# Patient Record
Sex: MALE | Race: WHITE | Employment: OTHER | URBAN - METROPOLITAN AREA
[De-identification: names, ages, dates, MRNs, and addresses within clinical notes are randomized per-mention and may not be internally consistent; named-entity substitution may affect disease eponyms.]

---

## 2023-05-17 ENCOUNTER — HOSPITAL ENCOUNTER (OUTPATIENT)
Dept: PREADMISSION TESTING | Age: 68
End: 2023-05-17
Payer: MEDICARE

## 2023-05-17 ENCOUNTER — HOSPITAL ENCOUNTER (OUTPATIENT)
Dept: CT IMAGING | Age: 68
Discharge: HOME | End: 2023-05-17
Payer: MEDICARE

## 2023-05-17 ENCOUNTER — OFFICE VISIT (OUTPATIENT)
Dept: CARDIOLOGY CLINIC | Age: 68
End: 2023-05-17
Payer: MEDICARE

## 2023-05-17 VITALS
HEART RATE: 76 BPM | HEIGHT: 71 IN | DIASTOLIC BLOOD PRESSURE: 72 MMHG | SYSTOLIC BLOOD PRESSURE: 136 MMHG | WEIGHT: 150 LBS | OXYGEN SATURATION: 99 % | BODY MASS INDEX: 21 KG/M2

## 2023-05-17 DIAGNOSIS — R91.1 PULMONARY NODULE: ICD-10-CM

## 2023-05-17 DIAGNOSIS — Z01.810 PREOPERATIVE CARDIOVASCULAR EXAMINATION: Primary | ICD-10-CM

## 2023-05-17 DIAGNOSIS — I25.10 CORONARY ARTERY DISEASE DUE TO LIPID RICH PLAQUE: ICD-10-CM

## 2023-05-17 DIAGNOSIS — I25.83 CORONARY ARTERY DISEASE DUE TO LIPID RICH PLAQUE: ICD-10-CM

## 2023-05-17 DIAGNOSIS — R06.02 SHORTNESS OF BREATH: ICD-10-CM

## 2023-05-17 LAB
ABO + RH BLD: NORMAL
ANION GAP SERPL CALCULATED.3IONS-SCNC: 14 MMOL/L (ref 3–16)
APTT BLD: 28.7 SEC (ref 22.7–35.9)
BASOPHILS # BLD: 0 K/UL (ref 0–0.2)
BASOPHILS NFR BLD: 0.7 %
BILIRUB UR QL STRIP.AUTO: NEGATIVE
BLD GP AB SCN SERPL QL: NORMAL
BUN SERPL-MCNC: 14 MG/DL (ref 7–20)
CALCIUM SERPL-MCNC: 9.3 MG/DL (ref 8.3–10.6)
CHLORIDE SERPL-SCNC: 101 MMOL/L (ref 99–110)
CLARITY UR: CLEAR
CO2 SERPL-SCNC: 24 MMOL/L (ref 21–32)
COLOR UR: YELLOW
CREAT SERPL-MCNC: 0.8 MG/DL (ref 0.8–1.3)
DEPRECATED RDW RBC AUTO: 21.3 % (ref 12.4–15.4)
EOSINOPHIL # BLD: 0.3 K/UL (ref 0–0.6)
EOSINOPHIL NFR BLD: 4.5 %
GFR SERPLBLD CREATININE-BSD FMLA CKD-EPI: >60 ML/MIN/{1.73_M2}
GLUCOSE SERPL-MCNC: 81 MG/DL (ref 70–99)
GLUCOSE UR STRIP.AUTO-MCNC: NEGATIVE MG/DL
HCT VFR BLD AUTO: 38 % (ref 40.5–52.5)
HGB BLD-MCNC: 12.9 G/DL (ref 13.5–17.5)
HGB UR QL STRIP.AUTO: NEGATIVE
INR PPP: 0.98 (ref 0.84–1.16)
KETONES UR STRIP.AUTO-MCNC: NEGATIVE MG/DL
LEUKOCYTE ESTERASE UR QL STRIP.AUTO: NEGATIVE
LYMPHOCYTES # BLD: 1.6 K/UL (ref 1–5.1)
LYMPHOCYTES NFR BLD: 24.3 %
MCH RBC QN AUTO: 33.8 PG (ref 26–34)
MCHC RBC AUTO-ENTMCNC: 34.1 G/DL (ref 31–36)
MCV RBC AUTO: 99.1 FL (ref 80–100)
MONOCYTES # BLD: 0.9 K/UL (ref 0–1.3)
MONOCYTES NFR BLD: 14.1 %
NEUTROPHILS # BLD: 3.8 K/UL (ref 1.7–7.7)
NEUTROPHILS NFR BLD: 56.4 %
NITRITE UR QL STRIP.AUTO: NEGATIVE
PH UR STRIP.AUTO: 6.5 [PH] (ref 5–8)
PLATELET # BLD AUTO: 322 K/UL (ref 135–450)
PMV BLD AUTO: 6.9 FL (ref 5–10.5)
POTASSIUM SERPL-SCNC: 4.6 MMOL/L (ref 3.5–5.1)
PROT UR STRIP.AUTO-MCNC: NEGATIVE MG/DL
PROTHROMBIN TIME: 13 SEC (ref 11.5–14.8)
RBC # BLD AUTO: 3.83 M/UL (ref 4.2–5.9)
SODIUM SERPL-SCNC: 139 MMOL/L (ref 136–145)
SP GR UR STRIP.AUTO: 1.02 (ref 1–1.03)
UA COMPLETE W REFLEX CULTURE PNL UR: NORMAL
UA DIPSTICK W REFLEX MICRO PNL UR: NORMAL
URN SPEC COLLECT METH UR: NORMAL
UROBILINOGEN UR STRIP-ACNC: 0.2 E.U./DL
WBC # BLD AUTO: 6.7 K/UL (ref 4–11)

## 2023-05-17 PROCEDURE — 1036F TOBACCO NON-USER: CPT | Performed by: INTERNAL MEDICINE

## 2023-05-17 PROCEDURE — 3017F COLORECTAL CA SCREEN DOC REV: CPT | Performed by: INTERNAL MEDICINE

## 2023-05-17 PROCEDURE — 36415 COLL VENOUS BLD VENIPUNCTURE: CPT

## 2023-05-17 PROCEDURE — 86901 BLOOD TYPING SEROLOGIC RH(D): CPT

## 2023-05-17 PROCEDURE — 85025 COMPLETE CBC W/AUTO DIFF WBC: CPT

## 2023-05-17 PROCEDURE — 99204 OFFICE O/P NEW MOD 45 MIN: CPT | Performed by: INTERNAL MEDICINE

## 2023-05-17 PROCEDURE — G8420 CALC BMI NORM PARAMETERS: HCPCS | Performed by: INTERNAL MEDICINE

## 2023-05-17 PROCEDURE — 81003 URINALYSIS AUTO W/O SCOPE: CPT

## 2023-05-17 PROCEDURE — 71260 CT THORAX DX C+: CPT

## 2023-05-17 PROCEDURE — 85730 THROMBOPLASTIN TIME PARTIAL: CPT

## 2023-05-17 PROCEDURE — 86850 RBC ANTIBODY SCREEN: CPT

## 2023-05-17 PROCEDURE — 80048 BASIC METABOLIC PNL TOTAL CA: CPT

## 2023-05-17 PROCEDURE — 86900 BLOOD TYPING SEROLOGIC ABO: CPT

## 2023-05-17 PROCEDURE — 6360000004 HC RX CONTRAST MEDICATION: Performed by: THORACIC SURGERY (CARDIOTHORACIC VASCULAR SURGERY)

## 2023-05-17 PROCEDURE — 93000 ELECTROCARDIOGRAM COMPLETE: CPT | Performed by: INTERNAL MEDICINE

## 2023-05-17 PROCEDURE — 1123F ACP DISCUSS/DSCN MKR DOCD: CPT | Performed by: INTERNAL MEDICINE

## 2023-05-17 PROCEDURE — G8427 DOCREV CUR MEDS BY ELIG CLIN: HCPCS | Performed by: INTERNAL MEDICINE

## 2023-05-17 PROCEDURE — 85610 PROTHROMBIN TIME: CPT

## 2023-05-17 RX ORDER — DIAZEPAM 5 MG/1
5 TABLET ORAL EVERY 12 HOURS PRN
Status: ON HOLD | COMMUNITY
End: 2023-06-01 | Stop reason: HOSPADM

## 2023-05-17 RX ORDER — ATORVASTATIN CALCIUM 40 MG/1
40 TABLET, FILM COATED ORAL DAILY
Qty: 90 TABLET | Refills: 1 | Status: SHIPPED | OUTPATIENT
Start: 2023-05-17

## 2023-05-17 RX ADMIN — IOPAMIDOL 75 ML: 755 INJECTION, SOLUTION INTRAVENOUS at 15:51

## 2023-05-17 NOTE — PATIENT INSTRUCTIONS
Plan:  Echo to assess heart size, structure, function  Stress test Mily to assess for potential blockages within the arteries of the heart  Call [phone] to schedule echo and stress test  Cardiac clearance is pending based on cardiac test results  Take baby aspirin 81 mg daily-start after lung surgery  Start taking Lipitor 40 mg tab nightly  Follow up with NP in 6 months  Follow up with me 1 year

## 2023-05-17 NOTE — PROGRESS NOTES
[facility]   Cardiac Consultation    Referring Provider:  No primary care provider on file. Chief Complaint   Patient presents with    New Patient    Cardiac Clearance     Surgery with Dr. Alcon Solares        History of Present Illness:   Brandi Pierce, 74 y.o patient presenting today as a new patient referral for cardiac clearance referred by Dr. Michelet Haider MD for LVATSw/ KYLE lobectomy on 5/25/23. Today he reports that he no longer smokes, he quit last year. He has a tumor that was shrunk with radiation. He states that he gets short of breath on long walks. No SOB at rest. No associated chest pain. Resolves w/ rest. Moderate. Worse the past few months. He denies chest pain, dizziness syncope palpitations    Past Medical History:   has a past medical history of Chemotherapy-induced nausea, COPD (chronic obstructive pulmonary disease) ([facility] [address] Diverticulosis, Former smoker, and Squamous cell carcinoma lung, left ([facility] [address] Surgical History:   has a past surgical history that includes Ankle fracture surgery (Right, 2011); Colonoscopy; and bronchoscopy. Social History:   reports that he quit smoking about 10 months ago. His smoking use included cigarettes. He has a 50.00 pack-year smoking history. He has been exposed to tobacco smoke. He has never used smokeless tobacco. He reports that he does not currently use alcohol. He reports current drug use. Drug: Marijuana Riaz Shoemaker). Family History:  family history includes Cancer in his brother, mother, sibling, and sister; Diabetes in his mother; Stroke in his mother. Home Medications:  Prior to Admission medications    Medication Sig Start Date End Date Taking?  Authorizing Provider   albuterol (ACCUNEB) 1.25 MG/3ML nebulizer solution USE 1 VIAL IN NEBULIZER IN THE MORNING AND 1 AT BEDTIME 3/1/23  Yes Historical Provider, MD   albuterol sulfate HFA (PROVENTIL;VENTOLIN;PROAIR) 108 (90 Base) MCG/ACT inhaler Inhale 2 puffs into the lungs every 6 hours as

## 2023-05-18 ENCOUNTER — HOSPITAL ENCOUNTER (OUTPATIENT)
Dept: CARDIOLOGY | Age: 68
Discharge: HOME | End: 2023-05-18
Payer: MEDICARE

## 2023-05-18 DIAGNOSIS — R06.02 SHORTNESS OF BREATH: ICD-10-CM

## 2023-05-18 DIAGNOSIS — Z01.810 PREOPERATIVE CARDIOVASCULAR EXAMINATION: ICD-10-CM

## 2023-05-18 LAB
LV EF: 55 %
LV EF: 69 %
LVEF MODALITY: NORMAL
LVEF MODALITY: NORMAL

## 2023-05-18 PROCEDURE — 6360000002 HC RX W HCPCS: Performed by: INTERNAL MEDICINE

## 2023-05-18 PROCEDURE — 93306 TTE W/DOPPLER COMPLETE: CPT

## 2023-05-18 PROCEDURE — 93017 CV STRESS TEST TRACING ONLY: CPT

## 2023-05-18 PROCEDURE — 3430000000 HC RX DIAGNOSTIC RADIOPHARMACEUTICAL: Performed by: INTERNAL MEDICINE

## 2023-05-18 PROCEDURE — 78452 HT MUSCLE IMAGE SPECT MULT: CPT

## 2023-05-18 PROCEDURE — A9502 TC99M TETROFOSMIN: HCPCS | Performed by: INTERNAL MEDICINE

## 2023-05-18 RX ORDER — AMINOPHYLLINE DIHYDRATE 25 MG/ML
25 INJECTION, SOLUTION INTRAVENOUS ONCE
Status: COMPLETED | OUTPATIENT
Start: 2023-05-18 | End: 2023-05-18

## 2023-05-18 RX ADMIN — REGADENOSON 0.4 MG: 0.08 INJECTION, SOLUTION INTRAVENOUS at 14:00

## 2023-05-18 RX ADMIN — AMINOPHYLLINE 25 MG: 25 INJECTION, SOLUTION INTRAVENOUS at 16:00

## 2023-05-18 RX ADMIN — TETROFOSMIN 30.9 MILLICURIE: 1.38 INJECTION, POWDER, LYOPHILIZED, FOR SOLUTION INTRAVENOUS at 14:00

## 2023-05-18 RX ADMIN — TETROFOSMIN 10.5 MILLICURIE: 1.38 INJECTION, POWDER, LYOPHILIZED, FOR SOLUTION INTRAVENOUS at 13:23

## 2023-05-18 NOTE — PROGRESS NOTES
A Lexiscan Myoview stress test was completed on this Pt. The Pt rec'd 75 mg aminophylline ivp per protocol post Lexiscan to relieve symptoms of dizziness, elevated HR and sl low bp.  IV DC'd end of test.

## 2023-05-19 ENCOUNTER — TELEPHONE (OUTPATIENT)
Dept: CARDIOLOGY CLINIC | Age: 68
End: 2023-05-19

## 2023-05-19 NOTE — TELEPHONE ENCOUNTER
Created telephone encounter. Spoke with Janeen Perez relayed message per [address] regarding echo and stress test. Pt verbalized understanding.

## 2023-05-19 NOTE — TELEPHONE ENCOUNTER
----- Message from Supriya Crocker MD sent at 5/19/2023 10:56 AM EDT -----  Please notify patient that their stress test and echo both look good - heart fxn normal and no sig blockage   Ok for surgery

## 2023-05-24 ENCOUNTER — ANESTHESIA EVENT (OUTPATIENT)
Dept: OPERATING ROOM | Age: 68
End: 2023-05-24
Payer: MEDICARE

## 2023-05-25 ENCOUNTER — HOSPITAL ENCOUNTER (INPATIENT)
Age: 68
LOS: 7 days | Discharge: NURSING, REHAB OR LONG-TERM CARE | DRG: 163 | End: 2023-06-01
Attending: THORACIC SURGERY (CARDIOTHORACIC VASCULAR SURGERY) | Admitting: THORACIC SURGERY (CARDIOTHORACIC VASCULAR SURGERY)
Payer: MEDICARE

## 2023-05-25 ENCOUNTER — APPOINTMENT (OUTPATIENT)
Dept: GENERAL RADIOLOGY | Age: 68
DRG: 163 | End: 2023-05-25
Attending: THORACIC SURGERY (CARDIOTHORACIC VASCULAR SURGERY)
Payer: MEDICARE

## 2023-05-25 ENCOUNTER — ANESTHESIA (OUTPATIENT)
Dept: OPERATING ROOM | Age: 68
End: 2023-05-25
Payer: MEDICARE

## 2023-05-25 DIAGNOSIS — R91.1 LUNG NODULE: ICD-10-CM

## 2023-05-25 DIAGNOSIS — G89.18 ACUTE POST-OPERATIVE PAIN: Primary | ICD-10-CM

## 2023-05-25 LAB
ABO + RH BLD: NORMAL
ACTIVATED CLOTTING TIME: 114 SEC (ref 99–130)
BASE EXCESS BLDA CALC-SCNC: -4 MMOL/L (ref -3–3)
BASE EXCESS BLDA CALC-SCNC: 1 MMOL/L (ref -3–3)
BLD GP AB SCN SERPL QL: NORMAL
CA-I BLD-SCNC: 1.14 MMOL/L (ref 1.12–1.32)
CA-I BLD-SCNC: 1.34 MMOL/L (ref 1.12–1.32)
CO2 BLDA-SCNC: 23 MMOL/L
CO2 BLDA-SCNC: 27 MMOL/L
DEPRECATED RDW RBC AUTO: 19.3 % (ref 12.4–15.4)
GLUCOSE BLD-MCNC: 105 MG/DL (ref 70–99)
GLUCOSE BLD-MCNC: 164 MG/DL (ref 70–99)
HCO3 BLDA-SCNC: 22 MMOL/L (ref 21–29)
HCO3 BLDA-SCNC: 25.6 MMOL/L (ref 21–29)
HCT VFR BLD AUTO: 30.6 % (ref 40.5–52.5)
HCT VFR BLD AUTO: 31 % (ref 40.5–52.5)
HCT VFR BLD AUTO: 32 % (ref 40.5–52.5)
HGB BLD CALC-MCNC: 10.6 GM/DL (ref 13.5–17.5)
HGB BLD CALC-MCNC: 11 GM/DL (ref 13.5–17.5)
HGB BLD-MCNC: 10.5 G/DL (ref 13.5–17.5)
LACTATE BLD-SCNC: 0.31 MMOL/L (ref 0.4–2)
LACTATE BLD-SCNC: 1.01 MMOL/L (ref 0.4–2)
MCH RBC QN AUTO: 34 PG (ref 26–34)
MCHC RBC AUTO-ENTMCNC: 34.1 G/DL (ref 31–36)
MCV RBC AUTO: 99.7 FL (ref 80–100)
PCO2 BLDA: 41.5 MM HG (ref 35–45)
PCO2 BLDA: 44.5 MM HG (ref 35–45)
PERFORMED ON: ABNORMAL
PERFORMED ON: ABNORMAL
PH BLDA: 7.3 [PH] (ref 7.35–7.45)
PH BLDA: 7.4 [PH] (ref 7.35–7.45)
PLATELET # BLD AUTO: 278 K/UL (ref 135–450)
PMV BLD AUTO: 6.3 FL (ref 5–10.5)
PO2 BLDA: 309.5 MM HG (ref 75–108)
PO2 BLDA: 61.4 MM HG (ref 75–108)
POC SAMPLE TYPE: ABNORMAL
POC SAMPLE TYPE: ABNORMAL
POTASSIUM BLD-SCNC: 4 MMOL/L (ref 3.5–5.1)
POTASSIUM BLD-SCNC: 4.2 MMOL/L (ref 3.5–5.1)
RBC # BLD AUTO: 3.07 M/UL (ref 4.2–5.9)
SAO2 % BLDA: 100 % (ref 93–100)
SAO2 % BLDA: 89 % (ref 93–100)
SODIUM BLD-SCNC: 140 MMOL/L (ref 136–145)
SODIUM BLD-SCNC: 141 MMOL/L (ref 136–145)
WBC # BLD AUTO: 8.4 K/UL (ref 4–11)

## 2023-05-25 PROCEDURE — C9290 INJ, BUPIVACAINE LIPOSOME: HCPCS | Performed by: THORACIC SURGERY (CARDIOTHORACIC VASCULAR SURGERY)

## 2023-05-25 PROCEDURE — 82330 ASSAY OF CALCIUM: CPT

## 2023-05-25 PROCEDURE — 3600000008 HC SURGERY OHS BASE: Performed by: THORACIC SURGERY (CARDIOTHORACIC VASCULAR SURGERY)

## 2023-05-25 PROCEDURE — 38746 REMOVE THORACIC LYMPH NODES: CPT | Performed by: THORACIC SURGERY (CARDIOTHORACIC VASCULAR SURGERY)

## 2023-05-25 PROCEDURE — 3600000018 HC SURGERY OHS ADDTL 15MIN: Performed by: THORACIC SURGERY (CARDIOTHORACIC VASCULAR SURGERY)

## 2023-05-25 PROCEDURE — 32486 SLEEVE LOBECTOMY: CPT | Performed by: THORACIC SURGERY (CARDIOTHORACIC VASCULAR SURGERY)

## 2023-05-25 PROCEDURE — 85014 HEMATOCRIT: CPT

## 2023-05-25 PROCEDURE — 6360000002 HC RX W HCPCS: Performed by: THORACIC SURGERY (CARDIOTHORACIC VASCULAR SURGERY)

## 2023-05-25 PROCEDURE — 2709999900 HC NON-CHARGEABLE SUPPLY: Performed by: THORACIC SURGERY (CARDIOTHORACIC VASCULAR SURGERY)

## 2023-05-25 PROCEDURE — C1894 INTRO/SHEATH, NON-LASER: HCPCS | Performed by: THORACIC SURGERY (CARDIOTHORACIC VASCULAR SURGERY)

## 2023-05-25 PROCEDURE — 94760 N-INVAS EAR/PLS OXIMETRY 1: CPT

## 2023-05-25 PROCEDURE — 84132 ASSAY OF SERUM POTASSIUM: CPT

## 2023-05-25 PROCEDURE — 6360000002 HC RX W HCPCS: Performed by: NURSE ANESTHETIST, CERTIFIED REGISTERED

## 2023-05-25 PROCEDURE — 6370000000 HC RX 637 (ALT 250 FOR IP): Performed by: THORACIC SURGERY (CARDIOTHORACIC VASCULAR SURGERY)

## 2023-05-25 PROCEDURE — 83605 ASSAY OF LACTIC ACID: CPT

## 2023-05-25 PROCEDURE — 2580000003 HC RX 258: Performed by: THORACIC SURGERY (CARDIOTHORACIC VASCULAR SURGERY)

## 2023-05-25 PROCEDURE — 71045 X-RAY EXAM CHEST 1 VIEW: CPT

## 2023-05-25 PROCEDURE — 3700000001 HC ADD 15 MINUTES (ANESTHESIA): Performed by: THORACIC SURGERY (CARDIOTHORACIC VASCULAR SURGERY)

## 2023-05-25 PROCEDURE — 3E0T3BZ INTRODUCTION OF ANESTHETIC AGENT INTO PERIPHERAL NERVES AND PLEXI, PERCUTANEOUS APPROACH: ICD-10-PCS | Performed by: THORACIC SURGERY (CARDIOTHORACIC VASCULAR SURGERY)

## 2023-05-25 PROCEDURE — 88331 PATH CONSLTJ SURG 1 BLK 1SPC: CPT

## 2023-05-25 PROCEDURE — 2500000003 HC RX 250 WO HCPCS: Performed by: THORACIC SURGERY (CARDIOTHORACIC VASCULAR SURGERY)

## 2023-05-25 PROCEDURE — 82803 BLOOD GASES ANY COMBINATION: CPT

## 2023-05-25 PROCEDURE — 88305 TISSUE EXAM BY PATHOLOGIST: CPT

## 2023-05-25 PROCEDURE — 86900 BLOOD TYPING SEROLOGIC ABO: CPT

## 2023-05-25 PROCEDURE — 2500000003 HC RX 250 WO HCPCS: Performed by: NURSE ANESTHETIST, CERTIFIED REGISTERED

## 2023-05-25 PROCEDURE — 85027 COMPLETE CBC AUTOMATED: CPT

## 2023-05-25 PROCEDURE — 86923 COMPATIBILITY TEST ELECTRIC: CPT

## 2023-05-25 PROCEDURE — 2000000000 HC ICU R&B

## 2023-05-25 PROCEDURE — 2500000003 HC RX 250 WO HCPCS: Performed by: ANESTHESIOLOGY

## 2023-05-25 PROCEDURE — 3700000000 HC ANESTHESIA ATTENDED CARE: Performed by: THORACIC SURGERY (CARDIOTHORACIC VASCULAR SURGERY)

## 2023-05-25 PROCEDURE — 2720000010 HC SURG SUPPLY STERILE: Performed by: THORACIC SURGERY (CARDIOTHORACIC VASCULAR SURGERY)

## 2023-05-25 PROCEDURE — 84295 ASSAY OF SERUM SODIUM: CPT

## 2023-05-25 PROCEDURE — 86901 BLOOD TYPING SEROLOGIC RH(D): CPT

## 2023-05-25 PROCEDURE — 36415 COLL VENOUS BLD VENIPUNCTURE: CPT

## 2023-05-25 PROCEDURE — 82947 ASSAY GLUCOSE BLOOD QUANT: CPT

## 2023-05-25 PROCEDURE — 2580000003 HC RX 258: Performed by: NURSE ANESTHETIST, CERTIFIED REGISTERED

## 2023-05-25 PROCEDURE — A4217 STERILE WATER/SALINE, 500 ML: HCPCS | Performed by: THORACIC SURGERY (CARDIOTHORACIC VASCULAR SURGERY)

## 2023-05-25 PROCEDURE — 0BTG0ZZ RESECTION OF LEFT UPPER LUNG LOBE, OPEN APPROACH: ICD-10-PCS | Performed by: THORACIC SURGERY (CARDIOTHORACIC VASCULAR SURGERY)

## 2023-05-25 PROCEDURE — 94761 N-INVAS EAR/PLS OXIMETRY MLT: CPT

## 2023-05-25 PROCEDURE — C2618 PROBE/NEEDLE, CRYO: HCPCS | Performed by: THORACIC SURGERY (CARDIOTHORACIC VASCULAR SURGERY)

## 2023-05-25 PROCEDURE — 88309 TISSUE EXAM BY PATHOLOGIST: CPT

## 2023-05-25 PROCEDURE — 85347 COAGULATION TIME ACTIVATED: CPT

## 2023-05-25 PROCEDURE — 88312 SPECIAL STAINS GROUP 1: CPT

## 2023-05-25 PROCEDURE — 94640 AIRWAY INHALATION TREATMENT: CPT

## 2023-05-25 PROCEDURE — 2700000000 HC OXYGEN THERAPY PER DAY

## 2023-05-25 PROCEDURE — 86850 RBC ANTIBODY SCREEN: CPT

## 2023-05-25 PROCEDURE — C1729 CATH, DRAINAGE: HCPCS | Performed by: THORACIC SURGERY (CARDIOTHORACIC VASCULAR SURGERY)

## 2023-05-25 RX ORDER — ROCURONIUM BROMIDE 10 MG/ML
INJECTION, SOLUTION INTRAVENOUS PRN
Status: DISCONTINUED | OUTPATIENT
Start: 2023-05-25 | End: 2023-05-25 | Stop reason: SDUPTHER

## 2023-05-25 RX ORDER — SODIUM CHLORIDE 0.9 % (FLUSH) 0.9 %
5-40 SYRINGE (ML) INJECTION PRN
Status: DISCONTINUED | OUTPATIENT
Start: 2023-05-25 | End: 2023-05-25 | Stop reason: HOSPADM

## 2023-05-25 RX ORDER — MAGNESIUM HYDROXIDE 1200 MG/15ML
LIQUID ORAL CONTINUOUS PRN
Status: COMPLETED | OUTPATIENT
Start: 2023-05-25 | End: 2023-05-25

## 2023-05-25 RX ORDER — SODIUM CHLORIDE 0.9 % (FLUSH) 0.9 %
5-40 SYRINGE (ML) INJECTION EVERY 12 HOURS SCHEDULED
Status: DISCONTINUED | OUTPATIENT
Start: 2023-05-25 | End: 2023-05-25 | Stop reason: HOSPADM

## 2023-05-25 RX ORDER — MORPHINE SULFATE 2 MG/ML
2 INJECTION, SOLUTION INTRAMUSCULAR; INTRAVENOUS
Status: DISCONTINUED | OUTPATIENT
Start: 2023-05-25 | End: 2023-06-01 | Stop reason: HOSPADM

## 2023-05-25 RX ORDER — CEFAZOLIN SODIUM IN 0.9 % NACL 2 G/100 ML
2000 PLASTIC BAG, INJECTION (ML) INTRAVENOUS
Status: COMPLETED | OUTPATIENT
Start: 2023-05-25 | End: 2023-05-25

## 2023-05-25 RX ORDER — FAMOTIDINE 10 MG/ML
20 INJECTION, SOLUTION INTRAVENOUS 2 TIMES DAILY
Status: DISCONTINUED | OUTPATIENT
Start: 2023-05-25 | End: 2023-05-30

## 2023-05-25 RX ORDER — DEXTROSE, SODIUM CHLORIDE, AND POTASSIUM CHLORIDE 5; .45; .15 G/100ML; G/100ML; G/100ML
INJECTION INTRAVENOUS CONTINUOUS
Status: DISCONTINUED | OUTPATIENT
Start: 2023-05-25 | End: 2023-05-30

## 2023-05-25 RX ORDER — ACETYLCYSTEINE 200 MG/ML
600 SOLUTION ORAL; RESPIRATORY (INHALATION) 2 TIMES DAILY
Status: DISCONTINUED | OUTPATIENT
Start: 2023-05-25 | End: 2023-05-31

## 2023-05-25 RX ORDER — FAMOTIDINE 20 MG/1
20 TABLET, FILM COATED ORAL 2 TIMES DAILY
Status: DISCONTINUED | OUTPATIENT
Start: 2023-05-25 | End: 2023-05-30

## 2023-05-25 RX ORDER — KETAMINE HYDROCHLORIDE 50 MG/ML
INJECTION, SOLUTION, CONCENTRATE INTRAMUSCULAR; INTRAVENOUS PRN
Status: DISCONTINUED | OUTPATIENT
Start: 2023-05-25 | End: 2023-05-25 | Stop reason: SDUPTHER

## 2023-05-25 RX ORDER — CALCIUM CHLORIDE 100 MG/ML
INJECTION INTRAVENOUS; INTRAVENTRICULAR PRN
Status: DISCONTINUED | OUTPATIENT
Start: 2023-05-25 | End: 2023-05-25 | Stop reason: SDUPTHER

## 2023-05-25 RX ORDER — ENOXAPARIN SODIUM 100 MG/ML
40 INJECTION SUBCUTANEOUS DAILY
Status: DISCONTINUED | OUTPATIENT
Start: 2023-05-26 | End: 2023-06-01 | Stop reason: HOSPADM

## 2023-05-25 RX ORDER — SODIUM CHLORIDE 0.9 % (FLUSH) 0.9 %
5-40 SYRINGE (ML) INJECTION PRN
Status: DISCONTINUED | OUTPATIENT
Start: 2023-05-25 | End: 2023-06-01 | Stop reason: HOSPADM

## 2023-05-25 RX ORDER — PHENYLEPHRINE HCL IN 0.9% NACL 1 MG/10 ML
SYRINGE (ML) INTRAVENOUS PRN
Status: DISCONTINUED | OUTPATIENT
Start: 2023-05-25 | End: 2023-05-25 | Stop reason: SDUPTHER

## 2023-05-25 RX ORDER — ONDANSETRON 2 MG/ML
4 INJECTION INTRAMUSCULAR; INTRAVENOUS EVERY 6 HOURS PRN
Status: DISCONTINUED | OUTPATIENT
Start: 2023-05-25 | End: 2023-06-01 | Stop reason: HOSPADM

## 2023-05-25 RX ORDER — OXYCODONE HYDROCHLORIDE 5 MG/1
5 TABLET ORAL EVERY 4 HOURS PRN
Status: DISCONTINUED | OUTPATIENT
Start: 2023-05-25 | End: 2023-06-01 | Stop reason: HOSPADM

## 2023-05-25 RX ORDER — FENTANYL CITRATE 50 UG/ML
INJECTION, SOLUTION INTRAMUSCULAR; INTRAVENOUS PRN
Status: DISCONTINUED | OUTPATIENT
Start: 2023-05-25 | End: 2023-05-25 | Stop reason: SDUPTHER

## 2023-05-25 RX ORDER — ACETAMINOPHEN 500 MG
1000 TABLET ORAL ONCE
Status: COMPLETED | OUTPATIENT
Start: 2023-05-25 | End: 2023-05-25

## 2023-05-25 RX ORDER — HYDROMORPHONE HCL 110MG/55ML
PATIENT CONTROLLED ANALGESIA SYRINGE INTRAVENOUS PRN
Status: DISCONTINUED | OUTPATIENT
Start: 2023-05-25 | End: 2023-05-25 | Stop reason: SDUPTHER

## 2023-05-25 RX ORDER — FAMOTIDINE 10 MG/ML
20 INJECTION, SOLUTION INTRAVENOUS ONCE
Status: COMPLETED | OUTPATIENT
Start: 2023-05-25 | End: 2023-05-25

## 2023-05-25 RX ORDER — MORPHINE SULFATE 4 MG/ML
4 INJECTION, SOLUTION INTRAMUSCULAR; INTRAVENOUS
Status: DISCONTINUED | OUTPATIENT
Start: 2023-05-25 | End: 2023-06-01 | Stop reason: HOSPADM

## 2023-05-25 RX ORDER — SODIUM CHLORIDE 0.9 % (FLUSH) 0.9 %
5-40 SYRINGE (ML) INJECTION EVERY 12 HOURS SCHEDULED
Status: DISCONTINUED | OUTPATIENT
Start: 2023-05-25 | End: 2023-06-01 | Stop reason: HOSPADM

## 2023-05-25 RX ORDER — GABAPENTIN 300 MG/1
300 CAPSULE ORAL 3 TIMES DAILY
Status: DISCONTINUED | OUTPATIENT
Start: 2023-05-25 | End: 2023-05-30

## 2023-05-25 RX ORDER — ALBUTEROL SULFATE 2.5 MG/3ML
2.5 SOLUTION RESPIRATORY (INHALATION)
Status: DISCONTINUED | OUTPATIENT
Start: 2023-05-25 | End: 2023-06-01 | Stop reason: HOSPADM

## 2023-05-25 RX ORDER — ONDANSETRON 2 MG/ML
INJECTION INTRAMUSCULAR; INTRAVENOUS PRN
Status: DISCONTINUED | OUTPATIENT
Start: 2023-05-25 | End: 2023-05-25 | Stop reason: SDUPTHER

## 2023-05-25 RX ORDER — EPINEPHRINE 1 MG/ML
INJECTION, SOLUTION, CONCENTRATE INTRAVENOUS PRN
Status: DISCONTINUED | OUTPATIENT
Start: 2023-05-25 | End: 2023-05-25 | Stop reason: SDUPTHER

## 2023-05-25 RX ORDER — PROPOFOL 10 MG/ML
INJECTION, EMULSION INTRAVENOUS PRN
Status: DISCONTINUED | OUTPATIENT
Start: 2023-05-25 | End: 2023-05-25 | Stop reason: SDUPTHER

## 2023-05-25 RX ORDER — SODIUM CHLORIDE, SODIUM LACTATE, POTASSIUM CHLORIDE, CALCIUM CHLORIDE 600; 310; 30; 20 MG/100ML; MG/100ML; MG/100ML; MG/100ML
INJECTION, SOLUTION INTRAVENOUS CONTINUOUS PRN
Status: DISCONTINUED | OUTPATIENT
Start: 2023-05-25 | End: 2023-05-25 | Stop reason: SDUPTHER

## 2023-05-25 RX ORDER — SODIUM CHLORIDE 9 MG/ML
INJECTION, SOLUTION INTRAVENOUS PRN
Status: DISCONTINUED | OUTPATIENT
Start: 2023-05-25 | End: 2023-05-25

## 2023-05-25 RX ORDER — SODIUM CHLORIDE, SODIUM LACTATE, POTASSIUM CHLORIDE, CALCIUM CHLORIDE 600; 310; 30; 20 MG/100ML; MG/100ML; MG/100ML; MG/100ML
INJECTION, SOLUTION INTRAVENOUS CONTINUOUS
Status: DISCONTINUED | OUTPATIENT
Start: 2023-05-25 | End: 2023-05-25

## 2023-05-25 RX ORDER — MIDAZOLAM HYDROCHLORIDE 1 MG/ML
INJECTION INTRAMUSCULAR; INTRAVENOUS PRN
Status: DISCONTINUED | OUTPATIENT
Start: 2023-05-25 | End: 2023-05-25 | Stop reason: SDUPTHER

## 2023-05-25 RX ORDER — AMIODARONE HYDROCHLORIDE 200 MG/1
400 TABLET ORAL 2 TIMES DAILY
Status: COMPLETED | OUTPATIENT
Start: 2023-05-25 | End: 2023-05-27

## 2023-05-25 RX ORDER — DEXAMETHASONE SODIUM PHOSPHATE 4 MG/ML
INJECTION, SOLUTION INTRA-ARTICULAR; INTRALESIONAL; INTRAMUSCULAR; INTRAVENOUS; SOFT TISSUE PRN
Status: DISCONTINUED | OUTPATIENT
Start: 2023-05-25 | End: 2023-05-25 | Stop reason: SDUPTHER

## 2023-05-25 RX ORDER — SODIUM CHLORIDE 9 MG/ML
INJECTION, SOLUTION INTRAVENOUS PRN
Status: DISCONTINUED | OUTPATIENT
Start: 2023-05-25 | End: 2023-05-25 | Stop reason: HOSPADM

## 2023-05-25 RX ORDER — LIDOCAINE HYDROCHLORIDE 20 MG/ML
INJECTION, SOLUTION INFILTRATION; PERINEURAL PRN
Status: DISCONTINUED | OUTPATIENT
Start: 2023-05-25 | End: 2023-05-25 | Stop reason: SDUPTHER

## 2023-05-25 RX ORDER — SODIUM CHLORIDE 9 MG/ML
INJECTION, SOLUTION INTRAVENOUS PRN
Status: DISCONTINUED | OUTPATIENT
Start: 2023-05-25 | End: 2023-06-01 | Stop reason: HOSPADM

## 2023-05-25 RX ADMIN — Medication 100 MCG: at 15:31

## 2023-05-25 RX ADMIN — Medication 100 MCG: at 14:18

## 2023-05-25 RX ADMIN — Medication 100 MCG: at 13:40

## 2023-05-25 RX ADMIN — MIDAZOLAM HYDROCHLORIDE 2 MG: 2 INJECTION, SOLUTION INTRAMUSCULAR; INTRAVENOUS at 13:01

## 2023-05-25 RX ADMIN — Medication 100 MCG: at 14:12

## 2023-05-25 RX ADMIN — ONDANSETRON 4 MG: 2 INJECTION INTRAMUSCULAR; INTRAVENOUS at 13:01

## 2023-05-25 RX ADMIN — LIDOCAINE HYDROCHLORIDE 100 MG: 20 INJECTION, SOLUTION INFILTRATION; PERINEURAL at 13:09

## 2023-05-25 RX ADMIN — SODIUM CHLORIDE, SODIUM LACTATE, POTASSIUM CHLORIDE, AND CALCIUM CHLORIDE: .6; .31; .03; .02 INJECTION, SOLUTION INTRAVENOUS at 13:42

## 2023-05-25 RX ADMIN — EPINEPHRINE 20 MCG: 1 INJECTION, SOLUTION, CONCENTRATE INTRAVENOUS at 16:52

## 2023-05-25 RX ADMIN — ROCURONIUM BROMIDE 50 MG: 10 SOLUTION INTRAVENOUS at 13:09

## 2023-05-25 RX ADMIN — PROPOFOL 150 MG: 10 INJECTION, EMULSION INTRAVENOUS at 13:09

## 2023-05-25 RX ADMIN — FAMOTIDINE 20 MG: 10 INJECTION, SOLUTION INTRAVENOUS at 21:25

## 2023-05-25 RX ADMIN — MORPHINE SULFATE 2 MG: 2 INJECTION, SOLUTION INTRAMUSCULAR; INTRAVENOUS at 22:13

## 2023-05-25 RX ADMIN — Medication 2000 MG: at 13:16

## 2023-05-25 RX ADMIN — Medication 100 MCG: at 14:09

## 2023-05-25 RX ADMIN — SODIUM CHLORIDE, SODIUM LACTATE, POTASSIUM CHLORIDE, AND CALCIUM CHLORIDE: .6; .31; .03; .02 INJECTION, SOLUTION INTRAVENOUS at 13:01

## 2023-05-25 RX ADMIN — ROCURONIUM BROMIDE 20 MG: 10 SOLUTION INTRAVENOUS at 13:30

## 2023-05-25 RX ADMIN — ROCURONIUM BROMIDE 10 MG: 10 SOLUTION INTRAVENOUS at 15:24

## 2023-05-25 RX ADMIN — DEXAMETHASONE SODIUM PHOSPHATE 8 MG: 4 INJECTION, SOLUTION INTRAMUSCULAR; INTRAVENOUS at 13:09

## 2023-05-25 RX ADMIN — HYDROMORPHONE HYDROCHLORIDE 1 MG: 2 INJECTION, SOLUTION INTRAMUSCULAR; INTRAVENOUS; SUBCUTANEOUS at 17:12

## 2023-05-25 RX ADMIN — ROCURONIUM BROMIDE 20 MG: 10 SOLUTION INTRAVENOUS at 14:19

## 2023-05-25 RX ADMIN — FENTANYL CITRATE 50 MCG: 50 INJECTION, SOLUTION INTRAMUSCULAR; INTRAVENOUS at 13:01

## 2023-05-25 RX ADMIN — AMIODARONE HYDROCHLORIDE 400 MG: 200 TABLET ORAL at 21:25

## 2023-05-25 RX ADMIN — Medication 100 MCG: at 15:46

## 2023-05-25 RX ADMIN — Medication 100 MCG: at 14:30

## 2023-05-25 RX ADMIN — FENTANYL CITRATE 50 MCG: 50 INJECTION, SOLUTION INTRAMUSCULAR; INTRAVENOUS at 13:32

## 2023-05-25 RX ADMIN — Medication 100 MCG: at 14:26

## 2023-05-25 RX ADMIN — ROCURONIUM BROMIDE 20 MG: 10 SOLUTION INTRAVENOUS at 14:54

## 2023-05-25 RX ADMIN — KETAMINE HYDROCHLORIDE 30 MG: 50 INJECTION INTRAMUSCULAR; INTRAVENOUS at 13:23

## 2023-05-25 RX ADMIN — SODIUM CHLORIDE, PRESERVATIVE FREE 10 ML: 5 INJECTION INTRAVENOUS at 21:26

## 2023-05-25 RX ADMIN — SODIUM CHLORIDE, SODIUM LACTATE, POTASSIUM CHLORIDE, AND CALCIUM CHLORIDE: .6; .31; .03; .02 INJECTION, SOLUTION INTRAVENOUS at 15:52

## 2023-05-25 RX ADMIN — ROCURONIUM BROMIDE 10 MG: 10 SOLUTION INTRAVENOUS at 16:13

## 2023-05-25 RX ADMIN — Medication 100 MCG: at 14:15

## 2023-05-25 RX ADMIN — Medication 100 MCG: at 14:54

## 2023-05-25 RX ADMIN — ACETAMINOPHEN 1000 MG: 500 TABLET ORAL at 11:08

## 2023-05-25 RX ADMIN — Medication 100 MCG: at 14:23

## 2023-05-25 RX ADMIN — SODIUM CHLORIDE, SODIUM LACTATE, POTASSIUM CHLORIDE, AND CALCIUM CHLORIDE: .6; .31; .03; .02 INJECTION, SOLUTION INTRAVENOUS at 17:09

## 2023-05-25 RX ADMIN — FENTANYL CITRATE 50 MCG: 50 INJECTION, SOLUTION INTRAMUSCULAR; INTRAVENOUS at 14:19

## 2023-05-25 RX ADMIN — FENTANYL CITRATE 50 MCG: 50 INJECTION, SOLUTION INTRAMUSCULAR; INTRAVENOUS at 14:02

## 2023-05-25 RX ADMIN — SUGAMMADEX 200 MG: 100 INJECTION, SOLUTION INTRAVENOUS at 17:00

## 2023-05-25 RX ADMIN — KETAMINE HYDROCHLORIDE 20 MG: 50 INJECTION INTRAMUSCULAR; INTRAVENOUS at 16:13

## 2023-05-25 RX ADMIN — ALBUTEROL SULFATE 2.5 MG: 2.5 SOLUTION RESPIRATORY (INHALATION) at 18:28

## 2023-05-25 RX ADMIN — FAMOTIDINE 20 MG: 10 INJECTION, SOLUTION INTRAVENOUS at 11:08

## 2023-05-25 RX ADMIN — Medication 100 MCG: at 14:20

## 2023-05-25 RX ADMIN — CALCIUM CHLORIDE 1 G: 100 INJECTION, SOLUTION INTRAVENOUS at 16:51

## 2023-05-25 RX ADMIN — GABAPENTIN 300 MG: 300 CAPSULE ORAL at 21:25

## 2023-05-25 RX ADMIN — ACETYLCYSTEINE 600 MG: 200 SOLUTION ORAL; RESPIRATORY (INHALATION) at 18:28

## 2023-05-25 RX ADMIN — POTASSIUM CHLORIDE, DEXTROSE MONOHYDRATE AND SODIUM CHLORIDE: 150; 5; 450 INJECTION, SOLUTION INTRAVENOUS at 18:24

## 2023-05-25 ASSESSMENT — PAIN DESCRIPTION - ORIENTATION
ORIENTATION: LEFT
ORIENTATION: LEFT

## 2023-05-25 ASSESSMENT — PAIN DESCRIPTION - DESCRIPTORS
DESCRIPTORS: ACHING;DISCOMFORT
DESCRIPTORS: ACHING;DISCOMFORT

## 2023-05-25 ASSESSMENT — PAIN SCALES - GENERAL
PAINLEVEL_OUTOF10: 7
PAINLEVEL_OUTOF10: 0
PAINLEVEL_OUTOF10: 7
PAINLEVEL_OUTOF10: 0

## 2023-05-25 ASSESSMENT — PAIN DESCRIPTION - ONSET: ONSET: PROGRESSIVE

## 2023-05-25 ASSESSMENT — PAIN DESCRIPTION - PAIN TYPE: TYPE: ACUTE PAIN

## 2023-05-25 ASSESSMENT — PAIN DESCRIPTION - FREQUENCY: FREQUENCY: CONTINUOUS

## 2023-05-25 ASSESSMENT — PAIN DESCRIPTION - LOCATION
LOCATION: ARM
LOCATION: ARM

## 2023-05-25 ASSESSMENT — PAIN - FUNCTIONAL ASSESSMENT: PAIN_FUNCTIONAL_ASSESSMENT: NONE - DENIES PAIN

## 2023-05-25 NOTE — ANESTHESIA POSTPROCEDURE EVALUATION
Department of Anesthesiology  Postprocedure Note    Patient: Nelda Gregorio  MRN: [id number]  YOB: 1955  Date of evaluation: 5/25/2023      Procedure Summary     Date: 05/25/23 Room / Location: [facility] [address] [place] [address]    Anesthesia Start: 1301 Anesthesia Stop: 0932    Procedure: OPEN LEFT UPPER LOBECTOMY WITH SLEEVE RESECTION OF PULMONARY ARTERY AND BRONCHUS WITH MUSCLE FLAP CRYOABLATION AND INTERCOSTAL NERVE BLOCK (Left) Diagnosis:       Lung nodule      (Lung nodule)    Surgeons: Yaron Solano MD Responsible Provider: Priscilla Kelly MD    Anesthesia Type: general ASA Status: 3          Anesthesia Type: No value filed.     Eula Phase I: Eula Score: 10    Eula Phase II:        Anesthesia Post Evaluation    Comments: Postoperative Anesthesia Note    Name:    Nelda Gregorio  MRN:      [id number]    Patient Vitals in the past 12 hrs:  05/25/23 1753, Pulse:78, Resp:14, SpO2:92 %  05/25/23 1029, BP:(!) 142/79, Temp:97.9 °F (36.6 °C), Temp src:Skin, Pulse:87, Resp:18, SpO2:100 %, Weight:147 lb 4.8 oz (66.8 kg)     LABS:    CBC  Lab Results       Component                Value               Date/Time                  WBC                      8.4                 05/25/2023 05:55 PM        HGB                      10.5 (L)            05/25/2023 05:55 PM        HCT                      30.6 (L)            05/25/2023 05:55 PM        PLT                      278                 05/25/2023 05:55 PM   RENAL  Lab Results       Component                Value               Date/Time                  NA                       139                 05/17/2023 02:50 PM        K                        4.6                 05/17/2023 02:50 PM        CL                       101                 05/17/2023 02:50 PM        CO2                      24                  05/17/2023 02:50 PM        BUN                      14                  05/17/2023 02:50 PM        CREATININE               0.8

## 2023-05-25 NOTE — ANESTHESIA PRE PROCEDURE
Department of Anesthesiology  Preprocedure Note       Name:  Jimmie Narayan   Age:  74 y.o.  :  1955                                          MRN:  [id number]         Date:  2023      Surgeon: Latasha Reid):  [address], [place]    Procedure: Procedure(s):  OPEN LEFT UPPER LOBECTOMY WITH SLEEVE RESECTION OF PULMONARY ARTERY AND BRONCHUS WITH MUSCLE FLAP CRYOABLATION NOTE:  INTERCOSTAL NERVE BLOCK    Medications prior to admission:   Prior to Admission medications    Medication Sig Start Date End Date Taking? Authorizing Provider   diazePAM (VALIUM) 5 MG tablet Take 1 tablet by mouth every 12 hours as needed for Anxiety. Max Daily Amount: 10 mg   Yes Historical Provider, MD   atorvastatin (LIPITOR) 40 MG tablet Take 1 tablet by mouth daily 23   Rafael Hebert MD   fluticasone furoate-vilanterol (BREO ELLIPTA) 100-25 MCG/ACT inhaler Inhale 1 puff into the lungs daily  Patient not taking: Reported on 2023   Lucie Pablo MD   albuterol (ACCUNEB) 1.25 MG/3ML nebulizer solution USE 1 VIAL IN NEBULIZER IN THE MORNING AND 1 AT BEDTIME 3/1/23   Historical Provider, MD   albuterol sulfate HFA (PROVENTIL;VENTOLIN;PROAIR) 108 (90 Base) MCG/ACT inhaler Inhale 2 puffs into the lungs every 6 hours as needed 6/3/16   Historical Provider, MD   lidocaine-prilocaine (EMLA) 2.5-2.5 % cream Apply topically as needed (port - right) 22   Historical Provider, MD   LORazepam (ATIVAN) 0.5 MG tablet Take 1 tablet by mouth 2 times daily as needed. 3/22/23   Historical Provider, MD   mirtazapine (REMERON) 15 MG tablet Take 1 tablet by mouth nightly at bedtime.  23   Historical Provider, MD   ondansetron (ZOFRAN) 8 MG tablet Take 1 tablet by mouth every 8 hours as needed for nausea 23   Historical Provider, MD   pantoprazole (PROTONIX) 40 MG tablet Take 1 tablet by mouth daily 23   Historical Provider, MD   prochlorperazine (COMPAZINE) 10 MG tablet TAKE 1 TABLET BY MOUTH EVERY 6 HOURS AS NEEDED

## 2023-05-26 ENCOUNTER — APPOINTMENT (OUTPATIENT)
Dept: GENERAL RADIOLOGY | Age: 68
DRG: 163 | End: 2023-05-26
Attending: THORACIC SURGERY (CARDIOTHORACIC VASCULAR SURGERY)
Payer: MEDICARE

## 2023-05-26 PROBLEM — R91.1 LUNG NODULE: Status: ACTIVE | Noted: 2023-05-26

## 2023-05-26 LAB
ANION GAP SERPL CALCULATED.3IONS-SCNC: 10 MMOL/L (ref 3–16)
BUN SERPL-MCNC: 11 MG/DL (ref 7–20)
CALCIUM SERPL-MCNC: 8.5 MG/DL (ref 8.3–10.6)
CHLORIDE SERPL-SCNC: 97 MMOL/L (ref 99–110)
CO2 SERPL-SCNC: 24 MMOL/L (ref 21–32)
CREAT SERPL-MCNC: 0.8 MG/DL (ref 0.8–1.3)
DEPRECATED RDW RBC AUTO: 18.8 % (ref 12.4–15.4)
GFR SERPLBLD CREATININE-BSD FMLA CKD-EPI: >60 ML/MIN/{1.73_M2}
GLUCOSE SERPL-MCNC: 174 MG/DL (ref 70–99)
HCT VFR BLD AUTO: 30.2 % (ref 40.5–52.5)
HCT VFR BLD AUTO: 32.5 % (ref 40.5–52.5)
HGB BLD-MCNC: 10.3 G/DL (ref 13.5–17.5)
HGB BLD-MCNC: 10.9 G/DL (ref 13.5–17.5)
MAGNESIUM SERPL-MCNC: 1.7 MG/DL (ref 1.8–2.4)
MCH RBC QN AUTO: 33.8 PG (ref 26–34)
MCHC RBC AUTO-ENTMCNC: 34.1 G/DL (ref 31–36)
MCV RBC AUTO: 99 FL (ref 80–100)
PHOSPHATE SERPL-MCNC: 3.7 MG/DL (ref 2.5–4.9)
PLATELET # BLD AUTO: 277 K/UL (ref 135–450)
PMV BLD AUTO: 5.8 FL (ref 5–10.5)
POTASSIUM SERPL-SCNC: 5.1 MMOL/L (ref 3.5–5.1)
RBC # BLD AUTO: 3.05 M/UL (ref 4.2–5.9)
SODIUM SERPL-SCNC: 131 MMOL/L (ref 136–145)
WBC # BLD AUTO: 6.7 K/UL (ref 4–11)

## 2023-05-26 PROCEDURE — 85014 HEMATOCRIT: CPT

## 2023-05-26 PROCEDURE — 6360000002 HC RX W HCPCS: Performed by: THORACIC SURGERY (CARDIOTHORACIC VASCULAR SURGERY)

## 2023-05-26 PROCEDURE — 80048 BASIC METABOLIC PNL TOTAL CA: CPT

## 2023-05-26 PROCEDURE — 83735 ASSAY OF MAGNESIUM: CPT

## 2023-05-26 PROCEDURE — 97535 SELF CARE MNGMENT TRAINING: CPT

## 2023-05-26 PROCEDURE — 97110 THERAPEUTIC EXERCISES: CPT

## 2023-05-26 PROCEDURE — 2500000003 HC RX 250 WO HCPCS: Performed by: THORACIC SURGERY (CARDIOTHORACIC VASCULAR SURGERY)

## 2023-05-26 PROCEDURE — 94640 AIRWAY INHALATION TREATMENT: CPT

## 2023-05-26 PROCEDURE — 2700000000 HC OXYGEN THERAPY PER DAY

## 2023-05-26 PROCEDURE — 2580000003 HC RX 258: Performed by: THORACIC SURGERY (CARDIOTHORACIC VASCULAR SURGERY)

## 2023-05-26 PROCEDURE — 94669 MECHANICAL CHEST WALL OSCILL: CPT

## 2023-05-26 PROCEDURE — 84100 ASSAY OF PHOSPHORUS: CPT

## 2023-05-26 PROCEDURE — 2000000000 HC ICU R&B

## 2023-05-26 PROCEDURE — 36415 COLL VENOUS BLD VENIPUNCTURE: CPT

## 2023-05-26 PROCEDURE — 6360000002 HC RX W HCPCS: Performed by: NURSE PRACTITIONER

## 2023-05-26 PROCEDURE — 97530 THERAPEUTIC ACTIVITIES: CPT

## 2023-05-26 PROCEDURE — 85018 HEMOGLOBIN: CPT

## 2023-05-26 PROCEDURE — 85027 COMPLETE CBC AUTOMATED: CPT

## 2023-05-26 PROCEDURE — 97166 OT EVAL MOD COMPLEX 45 MIN: CPT

## 2023-05-26 PROCEDURE — 92610 EVALUATE SWALLOWING FUNCTION: CPT

## 2023-05-26 PROCEDURE — 99024 POSTOP FOLLOW-UP VISIT: CPT | Performed by: NURSE PRACTITIONER

## 2023-05-26 PROCEDURE — 97116 GAIT TRAINING THERAPY: CPT

## 2023-05-26 PROCEDURE — 6370000000 HC RX 637 (ALT 250 FOR IP): Performed by: THORACIC SURGERY (CARDIOTHORACIC VASCULAR SURGERY)

## 2023-05-26 PROCEDURE — 71045 X-RAY EXAM CHEST 1 VIEW: CPT

## 2023-05-26 PROCEDURE — 94761 N-INVAS EAR/PLS OXIMETRY MLT: CPT

## 2023-05-26 PROCEDURE — 97162 PT EVAL MOD COMPLEX 30 MIN: CPT

## 2023-05-26 RX ORDER — NALOXONE HYDROCHLORIDE 0.4 MG/ML
INJECTION, SOLUTION INTRAMUSCULAR; INTRAVENOUS; SUBCUTANEOUS PRN
Status: DISCONTINUED | OUTPATIENT
Start: 2023-05-26 | End: 2023-05-30

## 2023-05-26 RX ORDER — MORPHINE SULFATE/0.9% NACL/PF 1 MG/ML
SYRINGE (ML) INJECTION CONTINUOUS
Status: DISCONTINUED | OUTPATIENT
Start: 2023-05-26 | End: 2023-05-30

## 2023-05-26 RX ORDER — POLYETHYLENE GLYCOL 3350 17 G/17G
17 POWDER, FOR SOLUTION ORAL DAILY PRN
Status: DISCONTINUED | OUTPATIENT
Start: 2023-05-26 | End: 2023-05-30

## 2023-05-26 RX ADMIN — MORPHINE SULFATE 4 MG: 4 INJECTION, SOLUTION INTRAMUSCULAR; INTRAVENOUS at 05:52

## 2023-05-26 RX ADMIN — GABAPENTIN 300 MG: 300 CAPSULE ORAL at 08:11

## 2023-05-26 RX ADMIN — GABAPENTIN 300 MG: 300 CAPSULE ORAL at 14:14

## 2023-05-26 RX ADMIN — MUPIROCIN: 20 OINTMENT TOPICAL at 08:18

## 2023-05-26 RX ADMIN — MORPHINE SULFATE: 1 INJECTION INTRAVENOUS at 09:04

## 2023-05-26 RX ADMIN — POTASSIUM CHLORIDE, DEXTROSE MONOHYDRATE AND SODIUM CHLORIDE: 150; 5; 450 INJECTION, SOLUTION INTRAVENOUS at 05:39

## 2023-05-26 RX ADMIN — AMIODARONE HYDROCHLORIDE 400 MG: 200 TABLET ORAL at 08:10

## 2023-05-26 RX ADMIN — FAMOTIDINE 20 MG: 20 TABLET, FILM COATED ORAL at 20:40

## 2023-05-26 RX ADMIN — OXYCODONE 5 MG: 5 TABLET ORAL at 21:05

## 2023-05-26 RX ADMIN — ACETYLCYSTEINE 600 MG: 200 SOLUTION ORAL; RESPIRATORY (INHALATION) at 20:01

## 2023-05-26 RX ADMIN — ACETYLCYSTEINE 600 MG: 200 SOLUTION ORAL; RESPIRATORY (INHALATION) at 08:10

## 2023-05-26 RX ADMIN — MORPHINE SULFATE 2 MG: 2 INJECTION, SOLUTION INTRAMUSCULAR; INTRAVENOUS at 01:06

## 2023-05-26 RX ADMIN — ALBUTEROL SULFATE 2.5 MG: 2.5 SOLUTION RESPIRATORY (INHALATION) at 16:26

## 2023-05-26 RX ADMIN — ALBUTEROL SULFATE 2.5 MG: 2.5 SOLUTION RESPIRATORY (INHALATION) at 08:10

## 2023-05-26 RX ADMIN — GABAPENTIN 300 MG: 300 CAPSULE ORAL at 20:40

## 2023-05-26 RX ADMIN — MUPIROCIN: 20 OINTMENT TOPICAL at 20:41

## 2023-05-26 RX ADMIN — SODIUM CHLORIDE, PRESERVATIVE FREE 10 ML: 5 INJECTION INTRAVENOUS at 08:11

## 2023-05-26 RX ADMIN — MORPHINE SULFATE 4 MG: 4 INJECTION, SOLUTION INTRAMUSCULAR; INTRAVENOUS at 03:15

## 2023-05-26 RX ADMIN — ENOXAPARIN SODIUM 40 MG: 100 INJECTION SUBCUTANEOUS at 08:10

## 2023-05-26 RX ADMIN — OXYCODONE 5 MG: 5 TABLET ORAL at 08:11

## 2023-05-26 RX ADMIN — FAMOTIDINE 20 MG: 20 TABLET, FILM COATED ORAL at 08:11

## 2023-05-26 RX ADMIN — ALBUTEROL SULFATE 2.5 MG: 2.5 SOLUTION RESPIRATORY (INHALATION) at 20:01

## 2023-05-26 RX ADMIN — ALBUTEROL SULFATE 2.5 MG: 2.5 SOLUTION RESPIRATORY (INHALATION) at 12:04

## 2023-05-26 RX ADMIN — SODIUM CHLORIDE, PRESERVATIVE FREE 10 ML: 5 INJECTION INTRAVENOUS at 20:41

## 2023-05-26 RX ADMIN — SERTRALINE 100 MG: 50 TABLET, FILM COATED ORAL at 08:11

## 2023-05-26 RX ADMIN — METOPROLOL TARTRATE 25 MG: 25 TABLET, FILM COATED ORAL at 17:43

## 2023-05-26 RX ADMIN — AMIODARONE HYDROCHLORIDE 400 MG: 200 TABLET ORAL at 20:40

## 2023-05-26 ASSESSMENT — PAIN DESCRIPTION - FREQUENCY
FREQUENCY: CONTINUOUS
FREQUENCY: INTERMITTENT
FREQUENCY: CONTINUOUS
FREQUENCY: INTERMITTENT

## 2023-05-26 ASSESSMENT — PAIN DESCRIPTION - ONSET
ONSET: ON-GOING
ONSET: GRADUAL
ONSET: ON-GOING
ONSET: GRADUAL
ONSET: ON-GOING

## 2023-05-26 ASSESSMENT — PAIN SCALES - GENERAL
PAINLEVEL_OUTOF10: 7
PAINLEVEL_OUTOF10: 8
PAINLEVEL_OUTOF10: 0
PAINLEVEL_OUTOF10: 3
PAINLEVEL_OUTOF10: 5
PAINLEVEL_OUTOF10: 3
PAINLEVEL_OUTOF10: 0
PAINLEVEL_OUTOF10: 5
PAINLEVEL_OUTOF10: 0
PAINLEVEL_OUTOF10: 8

## 2023-05-26 ASSESSMENT — PAIN DESCRIPTION - DESCRIPTORS
DESCRIPTORS: ACHING
DESCRIPTORS: ACHING;DISCOMFORT
DESCRIPTORS: ACHING
DESCRIPTORS: ACHING;DISCOMFORT
DESCRIPTORS: ACHING;DISCOMFORT
DESCRIPTORS: ACHING
DESCRIPTORS: ACHING;DISCOMFORT

## 2023-05-26 ASSESSMENT — PAIN DESCRIPTION - PAIN TYPE
TYPE: ACUTE PAIN
TYPE: SURGICAL PAIN
TYPE: ACUTE PAIN
TYPE: SURGICAL PAIN
TYPE: ACUTE PAIN
TYPE: SURGICAL PAIN
TYPE: ACUTE PAIN

## 2023-05-26 ASSESSMENT — PAIN DESCRIPTION - ORIENTATION
ORIENTATION: LEFT
ORIENTATION: LEFT;MID
ORIENTATION: LEFT
ORIENTATION: LEFT;MID
ORIENTATION: LEFT

## 2023-05-26 ASSESSMENT — PAIN DESCRIPTION - LOCATION
LOCATION: CHEST
LOCATION: ARM
LOCATION: ARM
LOCATION: CHEST
LOCATION: CHEST
LOCATION: ARM
LOCATION: ARM

## 2023-05-26 ASSESSMENT — PAIN - FUNCTIONAL ASSESSMENT
PAIN_FUNCTIONAL_ASSESSMENT: ACTIVITIES ARE NOT PREVENTED

## 2023-05-27 ENCOUNTER — APPOINTMENT (OUTPATIENT)
Dept: GENERAL RADIOLOGY | Age: 68
DRG: 163 | End: 2023-05-27
Attending: THORACIC SURGERY (CARDIOTHORACIC VASCULAR SURGERY)
Payer: MEDICARE

## 2023-05-27 PROCEDURE — 99024 POSTOP FOLLOW-UP VISIT: CPT | Performed by: THORACIC SURGERY (CARDIOTHORACIC VASCULAR SURGERY)

## 2023-05-27 PROCEDURE — 71045 X-RAY EXAM CHEST 1 VIEW: CPT

## 2023-05-27 PROCEDURE — 2000000000 HC ICU R&B

## 2023-05-27 PROCEDURE — 6360000002 HC RX W HCPCS: Performed by: THORACIC SURGERY (CARDIOTHORACIC VASCULAR SURGERY)

## 2023-05-27 PROCEDURE — 94761 N-INVAS EAR/PLS OXIMETRY MLT: CPT

## 2023-05-27 PROCEDURE — 94669 MECHANICAL CHEST WALL OSCILL: CPT

## 2023-05-27 PROCEDURE — 94640 AIRWAY INHALATION TREATMENT: CPT

## 2023-05-27 PROCEDURE — 6360000002 HC RX W HCPCS: Performed by: NURSE PRACTITIONER

## 2023-05-27 PROCEDURE — 6370000000 HC RX 637 (ALT 250 FOR IP): Performed by: THORACIC SURGERY (CARDIOTHORACIC VASCULAR SURGERY)

## 2023-05-27 PROCEDURE — 2700000000 HC OXYGEN THERAPY PER DAY

## 2023-05-27 PROCEDURE — 2580000003 HC RX 258: Performed by: THORACIC SURGERY (CARDIOTHORACIC VASCULAR SURGERY)

## 2023-05-27 RX ADMIN — OXYCODONE 5 MG: 5 TABLET ORAL at 17:02

## 2023-05-27 RX ADMIN — GABAPENTIN 300 MG: 300 CAPSULE ORAL at 09:32

## 2023-05-27 RX ADMIN — ENOXAPARIN SODIUM 40 MG: 100 INJECTION SUBCUTANEOUS at 09:33

## 2023-05-27 RX ADMIN — SODIUM CHLORIDE, PRESERVATIVE FREE 10 ML: 5 INJECTION INTRAVENOUS at 20:13

## 2023-05-27 RX ADMIN — MUPIROCIN: 20 OINTMENT TOPICAL at 09:33

## 2023-05-27 RX ADMIN — ACETYLCYSTEINE 600 MG: 200 SOLUTION ORAL; RESPIRATORY (INHALATION) at 07:34

## 2023-05-27 RX ADMIN — MORPHINE SULFATE: 1 INJECTION INTRAVENOUS at 00:28

## 2023-05-27 RX ADMIN — ALBUTEROL SULFATE 2.5 MG: 2.5 SOLUTION RESPIRATORY (INHALATION) at 19:44

## 2023-05-27 RX ADMIN — FAMOTIDINE 20 MG: 20 TABLET, FILM COATED ORAL at 09:32

## 2023-05-27 RX ADMIN — ALBUTEROL SULFATE 2.5 MG: 2.5 SOLUTION RESPIRATORY (INHALATION) at 11:24

## 2023-05-27 RX ADMIN — ACETYLCYSTEINE 600 MG: 200 SOLUTION ORAL; RESPIRATORY (INHALATION) at 19:45

## 2023-05-27 RX ADMIN — OXYCODONE 5 MG: 5 TABLET ORAL at 11:35

## 2023-05-27 RX ADMIN — SERTRALINE 100 MG: 50 TABLET, FILM COATED ORAL at 09:32

## 2023-05-27 RX ADMIN — ALBUTEROL SULFATE 2.5 MG: 2.5 SOLUTION RESPIRATORY (INHALATION) at 07:34

## 2023-05-27 RX ADMIN — GABAPENTIN 300 MG: 300 CAPSULE ORAL at 20:13

## 2023-05-27 RX ADMIN — FAMOTIDINE 20 MG: 20 TABLET, FILM COATED ORAL at 20:13

## 2023-05-27 RX ADMIN — MUPIROCIN: 20 OINTMENT TOPICAL at 20:13

## 2023-05-27 RX ADMIN — ALBUTEROL SULFATE 2.5 MG: 2.5 SOLUTION RESPIRATORY (INHALATION) at 15:37

## 2023-05-27 RX ADMIN — SODIUM CHLORIDE, PRESERVATIVE FREE 10 ML: 5 INJECTION INTRAVENOUS at 09:33

## 2023-05-27 RX ADMIN — AMIODARONE HYDROCHLORIDE 400 MG: 200 TABLET ORAL at 09:33

## 2023-05-27 RX ADMIN — GABAPENTIN 300 MG: 300 CAPSULE ORAL at 14:09

## 2023-05-27 RX ADMIN — MORPHINE SULFATE 2 MG: 2 INJECTION, SOLUTION INTRAMUSCULAR; INTRAVENOUS at 20:14

## 2023-05-27 ASSESSMENT — PAIN DESCRIPTION - FREQUENCY
FREQUENCY: INTERMITTENT

## 2023-05-27 ASSESSMENT — PAIN SCALES - GENERAL
PAINLEVEL_OUTOF10: 0
PAINLEVEL_OUTOF10: 0
PAINLEVEL_OUTOF10: 5
PAINLEVEL_OUTOF10: 0
PAINLEVEL_OUTOF10: 9
PAINLEVEL_OUTOF10: 5
PAINLEVEL_OUTOF10: 5
PAINLEVEL_OUTOF10: 6
PAINLEVEL_OUTOF10: 0
PAINLEVEL_OUTOF10: 0

## 2023-05-27 ASSESSMENT — PAIN - FUNCTIONAL ASSESSMENT
PAIN_FUNCTIONAL_ASSESSMENT: ACTIVITIES ARE NOT PREVENTED
PAIN_FUNCTIONAL_ASSESSMENT: ACTIVITIES ARE NOT PREVENTED

## 2023-05-27 ASSESSMENT — PAIN DESCRIPTION - PAIN TYPE
TYPE: ACUTE PAIN;SURGICAL PAIN
TYPE: SURGICAL PAIN
TYPE: SURGICAL PAIN;ACUTE PAIN

## 2023-05-27 ASSESSMENT — PAIN DESCRIPTION - ONSET
ONSET: ON-GOING
ONSET: ON-GOING

## 2023-05-27 ASSESSMENT — PAIN DESCRIPTION - DESCRIPTORS
DESCRIPTORS: ACHING
DESCRIPTORS: ACHING

## 2023-05-27 ASSESSMENT — PAIN DESCRIPTION - ORIENTATION
ORIENTATION: LEFT

## 2023-05-27 ASSESSMENT — PAIN DESCRIPTION - LOCATION
LOCATION: CHEST

## 2023-05-28 ENCOUNTER — APPOINTMENT (OUTPATIENT)
Dept: GENERAL RADIOLOGY | Age: 68
DRG: 163 | End: 2023-05-28
Attending: THORACIC SURGERY (CARDIOTHORACIC VASCULAR SURGERY)
Payer: MEDICARE

## 2023-05-28 LAB
ANION GAP SERPL CALCULATED.3IONS-SCNC: 10 MMOL/L (ref 3–16)
BASOPHILS # BLD: 0 K/UL (ref 0–0.2)
BASOPHILS NFR BLD: 0.2 %
BUN SERPL-MCNC: 15 MG/DL (ref 7–20)
CALCIUM SERPL-MCNC: 7.9 MG/DL (ref 8.3–10.6)
CHLORIDE SERPL-SCNC: 98 MMOL/L (ref 99–110)
CO2 SERPL-SCNC: 25 MMOL/L (ref 21–32)
CREAT SERPL-MCNC: 0.8 MG/DL (ref 0.8–1.3)
DEPRECATED RDW RBC AUTO: 18.5 % (ref 12.4–15.4)
EOSINOPHIL # BLD: 0.1 K/UL (ref 0–0.6)
EOSINOPHIL NFR BLD: 0.9 %
GFR SERPLBLD CREATININE-BSD FMLA CKD-EPI: >60 ML/MIN/{1.73_M2}
GLUCOSE SERPL-MCNC: 136 MG/DL (ref 70–99)
HCT VFR BLD AUTO: 25.3 % (ref 40.5–52.5)
HGB BLD-MCNC: 8.7 G/DL (ref 13.5–17.5)
LYMPHOCYTES # BLD: 1.1 K/UL (ref 1–5.1)
LYMPHOCYTES NFR BLD: 10.3 %
MCH RBC QN AUTO: 34.3 PG (ref 26–34)
MCHC RBC AUTO-ENTMCNC: 34.4 G/DL (ref 31–36)
MCV RBC AUTO: 99.8 FL (ref 80–100)
MONOCYTES # BLD: 0.7 K/UL (ref 0–1.3)
MONOCYTES NFR BLD: 6.8 %
NEUTROPHILS # BLD: 8.5 K/UL (ref 1.7–7.7)
NEUTROPHILS NFR BLD: 81.8 %
PHOSPHATE SERPL-MCNC: 2.3 MG/DL (ref 2.5–4.9)
PLATELET # BLD AUTO: 270 K/UL (ref 135–450)
PMV BLD AUTO: 6.5 FL (ref 5–10.5)
POTASSIUM SERPL-SCNC: 3.8 MMOL/L (ref 3.5–5.1)
RBC # BLD AUTO: 2.54 M/UL (ref 4.2–5.9)
SODIUM SERPL-SCNC: 133 MMOL/L (ref 136–145)
WBC # BLD AUTO: 10.4 K/UL (ref 4–11)

## 2023-05-28 PROCEDURE — 36415 COLL VENOUS BLD VENIPUNCTURE: CPT

## 2023-05-28 PROCEDURE — 6360000002 HC RX W HCPCS: Performed by: THORACIC SURGERY (CARDIOTHORACIC VASCULAR SURGERY)

## 2023-05-28 PROCEDURE — 2700000000 HC OXYGEN THERAPY PER DAY

## 2023-05-28 PROCEDURE — 99024 POSTOP FOLLOW-UP VISIT: CPT | Performed by: THORACIC SURGERY (CARDIOTHORACIC VASCULAR SURGERY)

## 2023-05-28 PROCEDURE — 6370000000 HC RX 637 (ALT 250 FOR IP): Performed by: THORACIC SURGERY (CARDIOTHORACIC VASCULAR SURGERY)

## 2023-05-28 PROCEDURE — 84100 ASSAY OF PHOSPHORUS: CPT

## 2023-05-28 PROCEDURE — 85025 COMPLETE CBC W/AUTO DIFF WBC: CPT

## 2023-05-28 PROCEDURE — 2000000000 HC ICU R&B

## 2023-05-28 PROCEDURE — 71045 X-RAY EXAM CHEST 1 VIEW: CPT

## 2023-05-28 PROCEDURE — 94761 N-INVAS EAR/PLS OXIMETRY MLT: CPT

## 2023-05-28 PROCEDURE — 94640 AIRWAY INHALATION TREATMENT: CPT

## 2023-05-28 PROCEDURE — 94669 MECHANICAL CHEST WALL OSCILL: CPT

## 2023-05-28 PROCEDURE — 2580000003 HC RX 258: Performed by: THORACIC SURGERY (CARDIOTHORACIC VASCULAR SURGERY)

## 2023-05-28 PROCEDURE — 80048 BASIC METABOLIC PNL TOTAL CA: CPT

## 2023-05-28 RX ADMIN — FAMOTIDINE 20 MG: 20 TABLET, FILM COATED ORAL at 20:46

## 2023-05-28 RX ADMIN — MORPHINE SULFATE 4 MG: 4 INJECTION, SOLUTION INTRAMUSCULAR; INTRAVENOUS at 02:32

## 2023-05-28 RX ADMIN — ACETYLCYSTEINE 600 MG: 200 SOLUTION ORAL; RESPIRATORY (INHALATION) at 08:01

## 2023-05-28 RX ADMIN — OXYCODONE 5 MG: 5 TABLET ORAL at 13:10

## 2023-05-28 RX ADMIN — ALBUTEROL SULFATE 2.5 MG: 2.5 SOLUTION RESPIRATORY (INHALATION) at 08:01

## 2023-05-28 RX ADMIN — FAMOTIDINE 20 MG: 20 TABLET, FILM COATED ORAL at 09:21

## 2023-05-28 RX ADMIN — GABAPENTIN 300 MG: 300 CAPSULE ORAL at 20:46

## 2023-05-28 RX ADMIN — GABAPENTIN 300 MG: 300 CAPSULE ORAL at 13:58

## 2023-05-28 RX ADMIN — MORPHINE SULFATE 4 MG: 4 INJECTION, SOLUTION INTRAMUSCULAR; INTRAVENOUS at 19:31

## 2023-05-28 RX ADMIN — GABAPENTIN 300 MG: 300 CAPSULE ORAL at 09:21

## 2023-05-28 RX ADMIN — SERTRALINE 100 MG: 50 TABLET, FILM COATED ORAL at 09:21

## 2023-05-28 RX ADMIN — OXYCODONE 5 MG: 5 TABLET ORAL at 04:36

## 2023-05-28 RX ADMIN — ACETYLCYSTEINE 600 MG: 200 SOLUTION ORAL; RESPIRATORY (INHALATION) at 19:48

## 2023-05-28 RX ADMIN — OXYCODONE 5 MG: 5 TABLET ORAL at 22:41

## 2023-05-28 RX ADMIN — MUPIROCIN: 20 OINTMENT TOPICAL at 09:21

## 2023-05-28 RX ADMIN — SODIUM CHLORIDE, PRESERVATIVE FREE 10 ML: 5 INJECTION INTRAVENOUS at 09:22

## 2023-05-28 RX ADMIN — MUPIROCIN: 20 OINTMENT TOPICAL at 20:46

## 2023-05-28 RX ADMIN — ALBUTEROL SULFATE 2.5 MG: 2.5 SOLUTION RESPIRATORY (INHALATION) at 15:57

## 2023-05-28 RX ADMIN — ALBUTEROL SULFATE 2.5 MG: 2.5 SOLUTION RESPIRATORY (INHALATION) at 11:57

## 2023-05-28 RX ADMIN — SODIUM CHLORIDE, PRESERVATIVE FREE 10 ML: 5 INJECTION INTRAVENOUS at 20:46

## 2023-05-28 RX ADMIN — ALBUTEROL SULFATE 2.5 MG: 2.5 SOLUTION RESPIRATORY (INHALATION) at 19:48

## 2023-05-28 RX ADMIN — ENOXAPARIN SODIUM 40 MG: 100 INJECTION SUBCUTANEOUS at 09:20

## 2023-05-28 ASSESSMENT — PAIN SCALES - WONG BAKER
WONGBAKER_NUMERICALRESPONSE: 2
WONGBAKER_NUMERICALRESPONSE: 2

## 2023-05-28 ASSESSMENT — PAIN SCALES - GENERAL
PAINLEVEL_OUTOF10: 0
PAINLEVEL_OUTOF10: 8
PAINLEVEL_OUTOF10: 7
PAINLEVEL_OUTOF10: 7
PAINLEVEL_OUTOF10: 0
PAINLEVEL_OUTOF10: 5
PAINLEVEL_OUTOF10: 0
PAINLEVEL_OUTOF10: 9
PAINLEVEL_OUTOF10: 8
PAINLEVEL_OUTOF10: 7

## 2023-05-28 ASSESSMENT — PAIN DESCRIPTION - ORIENTATION
ORIENTATION: LEFT

## 2023-05-28 ASSESSMENT — PAIN DESCRIPTION - LOCATION
LOCATION: CHEST
LOCATION: CHEST
LOCATION: BACK

## 2023-05-29 ENCOUNTER — APPOINTMENT (OUTPATIENT)
Dept: GENERAL RADIOLOGY | Age: 68
DRG: 163 | End: 2023-05-29
Attending: THORACIC SURGERY (CARDIOTHORACIC VASCULAR SURGERY)
Payer: MEDICARE

## 2023-05-29 LAB
BLOOD BANK DISPENSE STATUS: NORMAL
BLOOD BANK PRODUCT CODE: NORMAL
BPU ID: NORMAL
DESCRIPTION BLOOD BANK: NORMAL

## 2023-05-29 PROCEDURE — 99024 POSTOP FOLLOW-UP VISIT: CPT | Performed by: THORACIC SURGERY (CARDIOTHORACIC VASCULAR SURGERY)

## 2023-05-29 PROCEDURE — 6360000002 HC RX W HCPCS: Performed by: THORACIC SURGERY (CARDIOTHORACIC VASCULAR SURGERY)

## 2023-05-29 PROCEDURE — 97530 THERAPEUTIC ACTIVITIES: CPT

## 2023-05-29 PROCEDURE — P9045 ALBUMIN (HUMAN), 5%, 250 ML: HCPCS | Performed by: THORACIC SURGERY (CARDIOTHORACIC VASCULAR SURGERY)

## 2023-05-29 PROCEDURE — 2500000003 HC RX 250 WO HCPCS: Performed by: THORACIC SURGERY (CARDIOTHORACIC VASCULAR SURGERY)

## 2023-05-29 PROCEDURE — 2580000003 HC RX 258: Performed by: THORACIC SURGERY (CARDIOTHORACIC VASCULAR SURGERY)

## 2023-05-29 PROCEDURE — 2000000000 HC ICU R&B

## 2023-05-29 PROCEDURE — 94761 N-INVAS EAR/PLS OXIMETRY MLT: CPT

## 2023-05-29 PROCEDURE — 97116 GAIT TRAINING THERAPY: CPT

## 2023-05-29 PROCEDURE — 71045 X-RAY EXAM CHEST 1 VIEW: CPT

## 2023-05-29 PROCEDURE — 94669 MECHANICAL CHEST WALL OSCILL: CPT

## 2023-05-29 PROCEDURE — 6370000000 HC RX 637 (ALT 250 FOR IP): Performed by: THORACIC SURGERY (CARDIOTHORACIC VASCULAR SURGERY)

## 2023-05-29 PROCEDURE — 94640 AIRWAY INHALATION TREATMENT: CPT

## 2023-05-29 PROCEDURE — 2700000000 HC OXYGEN THERAPY PER DAY

## 2023-05-29 PROCEDURE — 97110 THERAPEUTIC EXERCISES: CPT

## 2023-05-29 RX ORDER — ALBUMIN, HUMAN INJ 5% 5 %
25 SOLUTION INTRAVENOUS ONCE
Status: COMPLETED | OUTPATIENT
Start: 2023-05-29 | End: 2023-05-29

## 2023-05-29 RX ORDER — FUROSEMIDE 10 MG/ML
20 INJECTION INTRAMUSCULAR; INTRAVENOUS ONCE
Status: COMPLETED | OUTPATIENT
Start: 2023-05-29 | End: 2023-05-29

## 2023-05-29 RX ADMIN — ALBUTEROL SULFATE 2.5 MG: 2.5 SOLUTION RESPIRATORY (INHALATION) at 16:17

## 2023-05-29 RX ADMIN — SODIUM CHLORIDE, PRESERVATIVE FREE 10 ML: 5 INJECTION INTRAVENOUS at 19:55

## 2023-05-29 RX ADMIN — GABAPENTIN 300 MG: 300 CAPSULE ORAL at 08:39

## 2023-05-29 RX ADMIN — ACETYLCYSTEINE 600 MG: 200 SOLUTION ORAL; RESPIRATORY (INHALATION) at 08:15

## 2023-05-29 RX ADMIN — SERTRALINE 100 MG: 50 TABLET, FILM COATED ORAL at 08:39

## 2023-05-29 RX ADMIN — OXYCODONE 5 MG: 5 TABLET ORAL at 08:39

## 2023-05-29 RX ADMIN — ALBUTEROL SULFATE 2.5 MG: 2.5 SOLUTION RESPIRATORY (INHALATION) at 12:03

## 2023-05-29 RX ADMIN — GABAPENTIN 300 MG: 300 CAPSULE ORAL at 19:55

## 2023-05-29 RX ADMIN — GABAPENTIN 300 MG: 300 CAPSULE ORAL at 13:59

## 2023-05-29 RX ADMIN — ALBUTEROL SULFATE 2.5 MG: 2.5 SOLUTION RESPIRATORY (INHALATION) at 19:28

## 2023-05-29 RX ADMIN — ACETYLCYSTEINE 600 MG: 200 SOLUTION ORAL; RESPIRATORY (INHALATION) at 19:28

## 2023-05-29 RX ADMIN — OXYCODONE 5 MG: 5 TABLET ORAL at 18:56

## 2023-05-29 RX ADMIN — ALBUMIN (HUMAN) 25 G: 12.5 INJECTION, SOLUTION INTRAVENOUS at 11:30

## 2023-05-29 RX ADMIN — FUROSEMIDE 20 MG: 10 INJECTION, SOLUTION INTRAMUSCULAR; INTRAVENOUS at 11:21

## 2023-05-29 RX ADMIN — MUPIROCIN: 20 OINTMENT TOPICAL at 19:55

## 2023-05-29 RX ADMIN — ALBUTEROL SULFATE 2.5 MG: 2.5 SOLUTION RESPIRATORY (INHALATION) at 08:15

## 2023-05-29 RX ADMIN — FAMOTIDINE 20 MG: 10 INJECTION, SOLUTION INTRAVENOUS at 19:55

## 2023-05-29 RX ADMIN — ENOXAPARIN SODIUM 40 MG: 100 INJECTION SUBCUTANEOUS at 08:39

## 2023-05-29 RX ADMIN — SODIUM CHLORIDE, PRESERVATIVE FREE 10 ML: 5 INJECTION INTRAVENOUS at 08:40

## 2023-05-29 RX ADMIN — MUPIROCIN: 20 OINTMENT TOPICAL at 08:40

## 2023-05-29 RX ADMIN — FAMOTIDINE 20 MG: 20 TABLET, FILM COATED ORAL at 08:39

## 2023-05-29 RX ADMIN — MORPHINE SULFATE 4 MG: 4 INJECTION, SOLUTION INTRAMUSCULAR; INTRAVENOUS at 05:09

## 2023-05-29 RX ADMIN — OXYCODONE 5 MG: 5 TABLET ORAL at 03:47

## 2023-05-29 ASSESSMENT — PAIN SCALES - GENERAL
PAINLEVEL_OUTOF10: 8
PAINLEVEL_OUTOF10: 4
PAINLEVEL_OUTOF10: 4
PAINLEVEL_OUTOF10: 7
PAINLEVEL_OUTOF10: 7
PAINLEVEL_OUTOF10: 0
PAINLEVEL_OUTOF10: 0
PAINLEVEL_OUTOF10: 3
PAINLEVEL_OUTOF10: 0
PAINLEVEL_OUTOF10: 4
PAINLEVEL_OUTOF10: 4

## 2023-05-29 NOTE — PLAN OF CARE
Problem: Pain  Goal: Verbalizes/displays adequate comfort level or baseline comfort level  Outcome: Progressing  Flowsheets (Taken 5/29/2023 0202)  Verbalizes/displays adequate comfort level or baseline comfort level:   Encourage patient to monitor pain and request assistance   Assess pain using appropriate pain scale   Administer analgesics based on type and severity of pain and evaluate response   Implement non-pharmacological measures as appropriate and evaluate response     Problem: Safety - Adult  Goal: Free from fall injury  Outcome: Progressing  Flowsheets (Taken 5/29/2023 0202)  Free From Fall Injury: Instruct family/caregiver on patient safety  Note: Patient agrees to use call light for assistance. Demonstrates ability to use call light. Problem: Skin/Tissue Integrity  Goal: Absence of new skin breakdown  Description: 1. Monitor for areas of redness and/or skin breakdown  2. Assess vascular access sites hourly  3. Every 4-6 hours minimum:  Change oxygen saturation probe site  4. Every 4-6 hours:  If on nasal continuous positive airway pressure, respiratory therapy assess nares and determine need for appliance change or resting period.   Outcome: Progressing     Problem: Skin/Tissue Integrity - Adult  Goal: Incisions, wounds, or drain sites healing without S/S of infection  Outcome: Progressing  Flowsheets (Taken 5/29/2023 0202)  Incisions, Wounds, or Drain Sites Healing Without Sign and Symptoms of Infection: TWICE DAILY: Assess and document dressing/incision, wound bed, drain sites and surrounding tissue     Problem: Musculoskeletal - Adult  Goal: Return mobility to safest level of function  Outcome: Progressing  Flowsheets (Taken 5/29/2023 0202)  Return Mobility to Safest Level of Function:   Assess patient stability and activity tolerance for standing, transferring and ambulating with or without assistive devices   Assist with transfers and ambulation using safe patient handling equipment as

## 2023-05-29 NOTE — PLAN OF CARE
Problem: Discharge Planning  Goal: Discharge to home or other facility with appropriate resources  Outcome: Progressing     Problem: Pain  Goal: Verbalizes/displays adequate comfort level or baseline comfort level  5/29/2023 0824 by Roula Salas RN  Outcome: Progressing  Verbalizes/displays adequate comfort level or baseline comfort level:   Encourage patient to monitor pain and request assistance   Assess pain using appropriate pain scale   Administer analgesics based on type and severity of pain and evaluate response   Implement non-pharmacological measures as appropriate and evaluate response     Problem: Safety - Adult  Goal: Free from fall injury  5/29/2023 0824 by Roula Salas RN  Outcome: Progressing  Free From Fall Injury: Instruct family/caregiver on patient safety  Note: Patient agrees to use call light for assistance. Demonstrates ability to use call light. Problem: Skin/Tissue Integrity  Goal: Absence of new skin breakdown  Description: 1. Monitor for areas of redness and/or skin breakdown  2. Assess vascular access sites hourly  3. Every 4-6 hours minimum:  Change oxygen saturation probe site  4. Every 4-6 hours:  If on nasal continuous positive airway pressure, respiratory therapy assess nares and determine need for appliance change or resting period.   5/29/2023 0824 by Roula Salas RN  Outcome: Progressing     Problem: Chronic Conditions and Co-morbidities  Goal: Patient's chronic conditions and co-morbidity symptoms are monitored and maintained or improved  Outcome: Progressing     Problem: Respiratory - Adult  Goal: Achieves optimal ventilation and oxygenation  Outcome: Progressing     Problem: Skin/Tissue Integrity - Adult  Goal: Skin integrity remains intact  Outcome: Progressing  Goal: Incisions, wounds, or drain sites healing without S/S of infection  5/29/2023 0824 by Roula Salas RN  Outcome: Progressing  Incisions, Wounds, or Drain Sites Healing Without Sign and Symptoms of

## 2023-05-30 ENCOUNTER — APPOINTMENT (OUTPATIENT)
Dept: GENERAL RADIOLOGY | Age: 68
DRG: 163 | End: 2023-05-30
Attending: THORACIC SURGERY (CARDIOTHORACIC VASCULAR SURGERY)
Payer: MEDICARE

## 2023-05-30 LAB
ANION GAP SERPL CALCULATED.3IONS-SCNC: 12 MMOL/L (ref 3–16)
BASOPHILS # BLD: 0 K/UL (ref 0–0.2)
BASOPHILS NFR BLD: 0.2 %
BUN SERPL-MCNC: 10 MG/DL (ref 7–20)
CALCIUM SERPL-MCNC: 8 MG/DL (ref 8.3–10.6)
CHLORIDE SERPL-SCNC: 98 MMOL/L (ref 99–110)
CO2 SERPL-SCNC: 24 MMOL/L (ref 21–32)
CREAT SERPL-MCNC: 0.7 MG/DL (ref 0.8–1.3)
DEPRECATED RDW RBC AUTO: 17.5 % (ref 12.4–15.4)
EOSINOPHIL # BLD: 0.2 K/UL (ref 0–0.6)
EOSINOPHIL NFR BLD: 2.5 %
GFR SERPLBLD CREATININE-BSD FMLA CKD-EPI: >60 ML/MIN/{1.73_M2}
GLUCOSE SERPL-MCNC: 110 MG/DL (ref 70–99)
HCT VFR BLD AUTO: 24.1 % (ref 40.5–52.5)
HGB BLD-MCNC: 8.4 G/DL (ref 13.5–17.5)
LYMPHOCYTES # BLD: 1 K/UL (ref 1–5.1)
LYMPHOCYTES NFR BLD: 10.7 %
MCH RBC QN AUTO: 34.4 PG (ref 26–34)
MCHC RBC AUTO-ENTMCNC: 34.7 G/DL (ref 31–36)
MCV RBC AUTO: 99.1 FL (ref 80–100)
MONOCYTES # BLD: 0.8 K/UL (ref 0–1.3)
MONOCYTES NFR BLD: 8.1 %
NEUTROPHILS # BLD: 7.7 K/UL (ref 1.7–7.7)
NEUTROPHILS NFR BLD: 78.5 %
PLATELET # BLD AUTO: 291 K/UL (ref 135–450)
PMV BLD AUTO: 6 FL (ref 5–10.5)
POTASSIUM SERPL-SCNC: 3.5 MMOL/L (ref 3.5–5.1)
RBC # BLD AUTO: 2.43 M/UL (ref 4.2–5.9)
SODIUM SERPL-SCNC: 134 MMOL/L (ref 136–145)
WBC # BLD AUTO: 9.8 K/UL (ref 4–11)

## 2023-05-30 PROCEDURE — 99233 SBSQ HOSP IP/OBS HIGH 50: CPT | Performed by: INTERNAL MEDICINE

## 2023-05-30 PROCEDURE — 2000000000 HC ICU R&B

## 2023-05-30 PROCEDURE — A4217 STERILE WATER/SALINE, 500 ML: HCPCS | Performed by: INTERNAL MEDICINE

## 2023-05-30 PROCEDURE — 94761 N-INVAS EAR/PLS OXIMETRY MLT: CPT

## 2023-05-30 PROCEDURE — 6360000002 HC RX W HCPCS: Performed by: INTERNAL MEDICINE

## 2023-05-30 PROCEDURE — 2580000003 HC RX 258: Performed by: INTERNAL MEDICINE

## 2023-05-30 PROCEDURE — 80048 BASIC METABOLIC PNL TOTAL CA: CPT

## 2023-05-30 PROCEDURE — 36415 COLL VENOUS BLD VENIPUNCTURE: CPT

## 2023-05-30 PROCEDURE — 6360000002 HC RX W HCPCS: Performed by: THORACIC SURGERY (CARDIOTHORACIC VASCULAR SURGERY)

## 2023-05-30 PROCEDURE — 3609027000 HC BRONCHOSCOPY: Performed by: INTERNAL MEDICINE

## 2023-05-30 PROCEDURE — 94640 AIRWAY INHALATION TREATMENT: CPT

## 2023-05-30 PROCEDURE — 6370000000 HC RX 637 (ALT 250 FOR IP): Performed by: THORACIC SURGERY (CARDIOTHORACIC VASCULAR SURGERY)

## 2023-05-30 PROCEDURE — 85025 COMPLETE CBC W/AUTO DIFF WBC: CPT

## 2023-05-30 PROCEDURE — 2700000000 HC OXYGEN THERAPY PER DAY

## 2023-05-30 PROCEDURE — 99152 MOD SED SAME PHYS/QHP 5/>YRS: CPT | Performed by: INTERNAL MEDICINE

## 2023-05-30 PROCEDURE — 0B9F8ZZ DRAINAGE OF RIGHT LOWER LUNG LOBE, VIA NATURAL OR ARTIFICIAL OPENING ENDOSCOPIC: ICD-10-PCS | Performed by: INTERNAL MEDICINE

## 2023-05-30 PROCEDURE — 31622 DX BRONCHOSCOPE/WASH: CPT | Performed by: INTERNAL MEDICINE

## 2023-05-30 PROCEDURE — 2500000003 HC RX 250 WO HCPCS: Performed by: INTERNAL MEDICINE

## 2023-05-30 PROCEDURE — 2709999900 HC NON-CHARGEABLE SUPPLY: Performed by: INTERNAL MEDICINE

## 2023-05-30 PROCEDURE — 94669 MECHANICAL CHEST WALL OSCILL: CPT

## 2023-05-30 PROCEDURE — 2580000003 HC RX 258: Performed by: THORACIC SURGERY (CARDIOTHORACIC VASCULAR SURGERY)

## 2023-05-30 PROCEDURE — 71045 X-RAY EXAM CHEST 1 VIEW: CPT

## 2023-05-30 RX ORDER — MAGNESIUM HYDROXIDE 1200 MG/15ML
LIQUID ORAL CONTINUOUS PRN
Status: COMPLETED | OUTPATIENT
Start: 2023-05-30 | End: 2023-05-30

## 2023-05-30 RX ORDER — SODIUM CHLORIDE 9 MG/ML
INJECTION, SOLUTION INTRAVENOUS PRN
Status: DISCONTINUED | OUTPATIENT
Start: 2023-05-30 | End: 2023-05-30

## 2023-05-30 RX ORDER — PANTOPRAZOLE SODIUM 40 MG/1
40 TABLET, DELAYED RELEASE ORAL
Status: DISCONTINUED | OUTPATIENT
Start: 2023-05-30 | End: 2023-06-01 | Stop reason: HOSPADM

## 2023-05-30 RX ORDER — FENTANYL CITRATE 50 UG/ML
100 INJECTION, SOLUTION INTRAMUSCULAR; INTRAVENOUS ONCE
Status: DISCONTINUED | OUTPATIENT
Start: 2023-05-30 | End: 2023-05-31

## 2023-05-30 RX ORDER — FENTANYL CITRATE 50 UG/ML
INJECTION, SOLUTION INTRAMUSCULAR; INTRAVENOUS PRN
Status: DISCONTINUED | OUTPATIENT
Start: 2023-05-30 | End: 2023-05-30 | Stop reason: ALTCHOICE

## 2023-05-30 RX ORDER — MIDAZOLAM HYDROCHLORIDE 5 MG/ML
INJECTION INTRAMUSCULAR; INTRAVENOUS PRN
Status: DISCONTINUED | OUTPATIENT
Start: 2023-05-30 | End: 2023-05-30 | Stop reason: ALTCHOICE

## 2023-05-30 RX ORDER — SODIUM CHLORIDE 0.9 % (FLUSH) 0.9 %
5-40 SYRINGE (ML) INJECTION EVERY 12 HOURS SCHEDULED
Status: DISCONTINUED | OUTPATIENT
Start: 2023-05-30 | End: 2023-05-30

## 2023-05-30 RX ORDER — LIDOCAINE HYDROCHLORIDE 20 MG/ML
INJECTION, SOLUTION EPIDURAL; INFILTRATION; INTRACAUDAL; PERINEURAL PRN
Status: DISCONTINUED | OUTPATIENT
Start: 2023-05-30 | End: 2023-05-30 | Stop reason: ALTCHOICE

## 2023-05-30 RX ORDER — MIDAZOLAM HYDROCHLORIDE 1 MG/ML
5 INJECTION INTRAMUSCULAR; INTRAVENOUS ONCE
Status: DISCONTINUED | OUTPATIENT
Start: 2023-05-30 | End: 2023-05-31

## 2023-05-30 RX ORDER — SODIUM CHLORIDE 0.9 % (FLUSH) 0.9 %
5-40 SYRINGE (ML) INJECTION PRN
Status: DISCONTINUED | OUTPATIENT
Start: 2023-05-30 | End: 2023-05-30

## 2023-05-30 RX ADMIN — SODIUM CHLORIDE, PRESERVATIVE FREE 10 ML: 5 INJECTION INTRAVENOUS at 20:40

## 2023-05-30 RX ADMIN — MUPIROCIN: 20 OINTMENT TOPICAL at 20:40

## 2023-05-30 RX ADMIN — PANTOPRAZOLE SODIUM 40 MG: 40 TABLET, DELAYED RELEASE ORAL at 08:48

## 2023-05-30 RX ADMIN — ALBUTEROL SULFATE 2.5 MG: 2.5 SOLUTION RESPIRATORY (INHALATION) at 15:55

## 2023-05-30 RX ADMIN — SERTRALINE 100 MG: 50 TABLET, FILM COATED ORAL at 08:46

## 2023-05-30 RX ADMIN — SODIUM CHLORIDE, PRESERVATIVE FREE 10 ML: 5 INJECTION INTRAVENOUS at 08:47

## 2023-05-30 RX ADMIN — ACETYLCYSTEINE 600 MG: 200 SOLUTION ORAL; RESPIRATORY (INHALATION) at 08:06

## 2023-05-30 RX ADMIN — ALBUTEROL SULFATE 2.5 MG: 2.5 SOLUTION RESPIRATORY (INHALATION) at 19:02

## 2023-05-30 RX ADMIN — MUPIROCIN: 20 OINTMENT TOPICAL at 08:47

## 2023-05-30 RX ADMIN — ALBUTEROL SULFATE 2.5 MG: 2.5 SOLUTION RESPIRATORY (INHALATION) at 08:06

## 2023-05-30 RX ADMIN — ACETYLCYSTEINE 600 MG: 200 SOLUTION ORAL; RESPIRATORY (INHALATION) at 19:02

## 2023-05-30 RX ADMIN — ENOXAPARIN SODIUM 40 MG: 100 INJECTION SUBCUTANEOUS at 08:46

## 2023-05-30 ASSESSMENT — ENCOUNTER SYMPTOMS
SHORTNESS OF BREATH: 1
CHEST TIGHTNESS: 1
GASTROINTESTINAL NEGATIVE: 1
ALLERGIC/IMMUNOLOGIC NEGATIVE: 1
EYES NEGATIVE: 1

## 2023-05-30 ASSESSMENT — PAIN SCALES - GENERAL
PAINLEVEL_OUTOF10: 0

## 2023-05-30 NOTE — CONSULTS
Consult Note            Date:5/30/2023        Patient Ryan Garcia     YOB: 1955     Age:67 y.o. Inpatient consult to Pulmonology  Consult performed by: Cammy Dawson MD  Consult ordered by: JOEL Jansen CNP        Chief Complaint   No chief complaint on file. History Obtained From   patient, electronic medical record    History of Present Illness   This is a 72-year-old gentleman who has had a open left upper lobectomy with sleep resection of pulmonary artery and bronchus with muscle flap cryoablation and intercostal nerve block. Surgery was done 5/25/2023. Pulmonary is being asked to see the patient for potential bronchoscopy as the patient is having more congestion. Patient is undergoing pulmonary toilet with vest therapy but does not feel like he gets enough mucus out. Still feeling congested. Past Medical History     Past Medical History:   Diagnosis Date    Chemotherapy-induced nausea     COPD (chronic obstructive pulmonary disease) ([facility] [address]     Diverticulosis     Former smoker     quit 6/22/22    Hyperlipidemia     Squamous cell carcinoma lung, left ([facility] [address] 05/10/2023    KYLE        Past Surgical History     Past Surgical History:   Procedure Laterality Date    ANKLE FRACTURE SURGERY Right 2011    BRONCHOSCOPY      w/ biopsy    COLONOSCOPY      LUNG REMOVAL, TOTAL Left 5/25/2023    OPEN LEFT UPPER LOBECTOMY WITH SLEEVE RESECTION OF PULMONARY ARTERY AND BRONCHUS WITH MUSCLE FLAP CRYOABLATION AND INTERCOSTAL NERVE BLOCK performed by Elysia Serrano MD at [address]         Medications     Prior to Admission medications    Medication Sig Start Date End Date Taking? Authorizing Provider   diazePAM (VALIUM) 5 MG tablet Take 1 tablet by mouth every 12 hours as needed for Anxiety.    Yes Historical Provider, MD   atorvastatin (LIPITOR) 40 MG tablet Take 1 tablet by mouth daily 5/17/23   Giovana Knight MD   fluticasone
1102 (!) 150/72 -- -- (!) 120 16 (!) 89 % --   05/30/23 1006 126/63 -- -- (!) 108 -- 99 % --   05/30/23 1005 129/64 -- -- (!) 108 -- 99 % --   05/30/23 1001 123/68 -- -- (!) 109 -- 98 % --   05/30/23 0900 (!) 124/58 -- -- (!) 108 18 98 % --   05/30/23 0806 -- -- -- (!) 102 18 94 % --   05/30/23 0800 115/74 98.3 °F (36.8 °C) Oral (!) 102 18 97 % --   05/30/23 0700 113/65 -- -- (!) 101 18 94 % --   05/30/23 0600 107/62 -- -- (!) 103 -- 95 % --   05/30/23 0500 114/62 -- -- (!) 105 -- 99 % --   05/30/23 0419 -- -- -- -- -- -- 137 lb 2 oz (62.2 kg)   05/30/23 0400 122/67 98.2 °F (36.8 °C) Oral (!) 109 -- 100 % --   05/30/23 0300 (!) 114/59 -- -- (!) 108 -- 99 % --   05/30/23 0200 110/61 -- -- (!) 105 -- 97 % --   05/30/23 0100 (!) 143/124 -- -- (!) 102 -- 92 % --   05/30/23 0000 116/61 -- -- (!) 101 -- 95 % --   05/29/23 2300 108/63 -- -- (!) 104 -- 96 % --   05/29/23 2200 111/66 -- -- (!) 105 -- 94 % --   05/29/23 2100 108/61 -- -- (!) 106 -- (!) 82 % --   05/29/23 2000 128/63 99.3 °F (37.4 °C) Oral (!) 117 -- 90 % --   05/29/23 1928 -- -- -- (!) 108 -- 92 % --   05/29/23 1800 (!) 116/58 -- -- (!) 107 18 -- --   05/29/23 1702 123/64 -- -- (!) 105 18 96 % --   05/29/23 1619 -- -- -- (!) 101 -- 90 % --   05/29/23 1617 -- -- -- 100 -- 97 % --   05/29/23 1601 126/63 98.5 °F (36.9 °C) Oral 100 18 99 % --   05/29/23 1600 -- -- -- (!) 103 -- -- --   05/29/23 1500 (!) 111/58 -- -- (!) 104 18 96 % --   05/29/23 1401 114/63 -- -- (!) 108 18 90 % --   05/29/23 1302 (!) 113/52 -- -- (!) 111 18 (!) 85 % --   05/29/23 1206 -- -- -- (!) 105 -- 96 % --     Const: No distress, encephalopathic  ENT: Oral mucosa moist  Eyes: No discharge or injection  CV: Regular rate and rhythm, no murmur rub or gallop noted  Resp: Lungs clear to auscultation bilaterally, no rales wheezes or ronchi  GI: Soft, nondistended. Neuro: Awake, encephalopathic. Spontaneously moves all four extremities. Skin: No lesions or rashes noted.    MSK: No joint

## 2023-05-30 NOTE — OP NOTE
Operative Note      Patient: Paresh Kaur  YOB: 1955  MRN: [id number]    Date of Procedure: 5/30/2023    Pre-Op Diagnosis Codes:     * Mucus plugging of bronchi [T17.500A]     * Acute respiratory failure, unspecified whether with hypoxia or [facility] [J96.00]    Post-Op Diagnosis: Same       Procedure(s):  BRONCHOSCOPY DIAGNOSTIC OR CELL [address] ONLY    Surgeon(s):  Walter Cardoso MD    Assistant:   * No surgical staff found *    Anesthesia: IV Sedation    Estimated Blood Loss (mL): Minimal    Complications: None    Specimens:   * No specimens in log *    Implants:  * No implants in log *      Drains:   [REMOVED] Chest Tube Left Pleural 1 (Removed)   Chest Tube Airleak No 05/27/23 1200   Status Gravity 05/27/23 1000   Suction To water seal 05/27/23 0400   Y Connector Used No 05/27/23 1200   Drainage Description Sanguinous 05/27/23 1200   Dressing Status Clean, dry & intact 05/27/23 1200   Chest Tube Dressing Dry 05/26/23 2000   Site Assessment Not assessed 05/27/23 1200   Surrounding Skin Unable to view 05/27/23 1200   Output (ml) 10 ml 05/27/23 1000       [REMOVED] Urinary Catheter 05/25/23 Pastor-Temperature (Removed)   Catheter Indications Need for fluid volume management of the critically ill patient in a critical care setting 05/26/23 0400   Site Assessment No urethral drainage;Pink 05/26/23 0400   Urine Color Yellow 05/26/23 0400   Urine Appearance Clear 05/26/23 0400   Urine Odor Other (Comment) 05/26/23 0400   Collection Container Standard 05/26/23 0400   Securement Method Securing device (Describe) 05/26/23 0400   Catheter Care  Perineal wipes 05/25/23 2000   Catheter Best Practices  Drainage tube clipped to bed;Catheter secured to thigh; Tamper seal intact; Bag below bladder;Bag not on floor; Lack of dependent loop in tubing;Drainage bag less than half full 05/26/23 0400   Status Draining;Patent 05/26/23 0400   Output (mL) 275 mL 05/26/23 0730       Findings: Minor mucous

## 2023-05-30 NOTE — CARE COORDINATION
LOS 5. Care managed by [facility]. S/P EVERETT Lobect. From home w spouse. ARU to eval- no cert needed. Currently on 6L 02- ARU states will need to be weaned down to 4L. Had previous plan for [facility]. Following for needs.  Herb Ambrocio RN

## 2023-05-30 NOTE — PLAN OF CARE
Problem: Discharge Planning  Goal: Discharge to home or other facility with appropriate resources  Outcome: Progressing     Problem: Pain  Goal: Verbalizes/displays adequate comfort level or baseline comfort level  Outcome: Progressing     Problem: Safety - Adult  Goal: Free from fall injury  Outcome: Progressing     Problem: Skin/Tissue Integrity  Goal: Absence of new skin breakdown  Description: 1. Monitor for areas of redness and/or skin breakdown  2. Assess vascular access sites hourly  3. Every 4-6 hours minimum:  Change oxygen saturation probe site  4. Every 4-6 hours:  If on nasal continuous positive airway pressure, respiratory therapy assess nares and determine need for appliance change or resting period.   Outcome: Progressing     Problem: Chronic Conditions and Co-morbidities  Goal: Patient's chronic conditions and co-morbidity symptoms are monitored and maintained or improved  Outcome: Progressing     Problem: Respiratory - Adult  Goal: Achieves optimal ventilation and oxygenation  Outcome: Progressing     Problem: Skin/Tissue Integrity - Adult  Goal: Skin integrity remains intact  Outcome: Progressing  Goal: Incisions, wounds, or drain sites healing without S/S of infection  Outcome: Progressing     Problem: Musculoskeletal - Adult  Goal: Return mobility to safest level of function  Outcome: Progressing  Goal: Return ADL status to a safe level of function  Outcome: Progressing     Problem: Gastrointestinal - Adult  Goal: Maintains or returns to baseline bowel function  Outcome: Progressing  Goal: Maintains adequate nutritional intake  Outcome: Progressing     Problem: Infection - Adult  Goal: Absence of infection at discharge  Outcome: Progressing     Cass Main RN

## 2023-05-30 NOTE — CARE COORDINATION
[facility] - Acute Rehab Unit   After review, this patient is felt to be:       []  Appropriate for Acute Inpatient Rehab    []  Appropriate for Acute Inpatient Rehab Pending Insurance Authorization    []  Not appropriate for Acute Inpatient Rehab    [x]  Referral received and ARU reviewing patient; Evaluation ongoing. Will continue to follow for improved pulmonary status. Bronch completed today. Pt requiring 6L at rest. D/w CM, Valerie. Will notify DCP with further updates. Thank you for the referral.    Thank you.    Rosemarie Luong, [id number] [facility]  Clinical Liaison

## 2023-05-31 ENCOUNTER — APPOINTMENT (OUTPATIENT)
Dept: GENERAL RADIOLOGY | Age: 68
DRG: 163 | End: 2023-05-31
Attending: THORACIC SURGERY (CARDIOTHORACIC VASCULAR SURGERY)
Payer: MEDICARE

## 2023-05-31 PROCEDURE — 6360000002 HC RX W HCPCS: Performed by: NURSE PRACTITIONER

## 2023-05-31 PROCEDURE — 99232 SBSQ HOSP IP/OBS MODERATE 35: CPT | Performed by: INTERNAL MEDICINE

## 2023-05-31 PROCEDURE — 94669 MECHANICAL CHEST WALL OSCILL: CPT

## 2023-05-31 PROCEDURE — 71045 X-RAY EXAM CHEST 1 VIEW: CPT

## 2023-05-31 PROCEDURE — 94640 AIRWAY INHALATION TREATMENT: CPT

## 2023-05-31 PROCEDURE — 92611 MOTION FLUOROSCOPY/SWALLOW: CPT

## 2023-05-31 PROCEDURE — 6360000002 HC RX W HCPCS: Performed by: THORACIC SURGERY (CARDIOTHORACIC VASCULAR SURGERY)

## 2023-05-31 PROCEDURE — 97530 THERAPEUTIC ACTIVITIES: CPT

## 2023-05-31 PROCEDURE — 99024 POSTOP FOLLOW-UP VISIT: CPT | Performed by: NURSE PRACTITIONER

## 2023-05-31 PROCEDURE — 74230 X-RAY XM SWLNG FUNCJ C+: CPT

## 2023-05-31 PROCEDURE — 2580000003 HC RX 258: Performed by: THORACIC SURGERY (CARDIOTHORACIC VASCULAR SURGERY)

## 2023-05-31 PROCEDURE — 2580000003 HC RX 258: Performed by: NURSE PRACTITIONER

## 2023-05-31 PROCEDURE — 97116 GAIT TRAINING THERAPY: CPT

## 2023-05-31 PROCEDURE — 92526 ORAL FUNCTION THERAPY: CPT

## 2023-05-31 PROCEDURE — 2000000000 HC ICU R&B

## 2023-05-31 PROCEDURE — 94761 N-INVAS EAR/PLS OXIMETRY MLT: CPT

## 2023-05-31 PROCEDURE — 2700000000 HC OXYGEN THERAPY PER DAY

## 2023-05-31 PROCEDURE — 6370000000 HC RX 637 (ALT 250 FOR IP): Performed by: THORACIC SURGERY (CARDIOTHORACIC VASCULAR SURGERY)

## 2023-05-31 RX ORDER — SODIUM CHLORIDE FOR INHALATION 3 %
4 VIAL, NEBULIZER (ML) INHALATION 2 TIMES DAILY
Status: DISCONTINUED | OUTPATIENT
Start: 2023-05-31 | End: 2023-06-01 | Stop reason: HOSPADM

## 2023-05-31 RX ADMIN — ENOXAPARIN SODIUM 40 MG: 100 INJECTION SUBCUTANEOUS at 10:05

## 2023-05-31 RX ADMIN — ALBUTEROL SULFATE 2.5 MG: 2.5 SOLUTION RESPIRATORY (INHALATION) at 19:37

## 2023-05-31 RX ADMIN — PIPERACILLIN AND TAZOBACTAM 3375 MG: 3; .375 INJECTION, POWDER, LYOPHILIZED, FOR SOLUTION INTRAVENOUS at 09:57

## 2023-05-31 RX ADMIN — ALBUTEROL SULFATE 2.5 MG: 2.5 SOLUTION RESPIRATORY (INHALATION) at 08:30

## 2023-05-31 RX ADMIN — SODIUM CHLORIDE, PRESERVATIVE FREE 10 ML: 5 INJECTION INTRAVENOUS at 09:00

## 2023-05-31 RX ADMIN — PIPERACILLIN AND TAZOBACTAM 3375 MG: 3; .375 INJECTION, POWDER, LYOPHILIZED, FOR SOLUTION INTRAVENOUS at 20:31

## 2023-05-31 RX ADMIN — SODIUM CHLORIDE SOLN NEBU 3% 4 ML: 3 NEBU SOLN at 08:31

## 2023-05-31 RX ADMIN — SERTRALINE 100 MG: 50 TABLET, FILM COATED ORAL at 10:05

## 2023-05-31 RX ADMIN — ALBUTEROL SULFATE 2.5 MG: 2.5 SOLUTION RESPIRATORY (INHALATION) at 11:54

## 2023-05-31 RX ADMIN — PANTOPRAZOLE SODIUM 40 MG: 40 TABLET, DELAYED RELEASE ORAL at 10:04

## 2023-05-31 RX ADMIN — ALBUTEROL SULFATE 2.5 MG: 2.5 SOLUTION RESPIRATORY (INHALATION) at 16:08

## 2023-05-31 RX ADMIN — SODIUM CHLORIDE, PRESERVATIVE FREE 10 ML: 5 INJECTION INTRAVENOUS at 20:36

## 2023-05-31 RX ADMIN — SODIUM CHLORIDE SOLN NEBU 3% 4 ML: 3 NEBU SOLN at 19:37

## 2023-05-31 ASSESSMENT — PAIN SCALES - GENERAL: PAINLEVEL_OUTOF10: 0

## 2023-05-31 NOTE — OP NOTE
Operative Note      Patient: Madelyn Marrero  YOB: 1955  MRN: [id number]    Date of Procedure: 5/25/2023    Pre-Op Diagnosis Codes:     * Lung nodule [R91.1]  COPD  Post chemoradiation      Post-Op Diagnosis: Same       Procedure(s):  OPEN LEFT UPPER LOBECTOMY WITH SLEEVE RESECTION OF PULMONARY ARTERY AND BRONCHUS WITH MUSCLE FLAP CRYOABLATION AND INTERCOSTAL NERVE BLOCK  Lysis of adhesions    Surgeon(s):  Emily Marie MD    Assistant:   Surgical Assistant: Shazia Cabrera    Anesthesia: General    Estimated Blood Loss (mL): 999    Complications: None    Specimens:   ID Type Source Tests Collected by Time Destination   A : LEVEL 9L LYMPH NODE Tissue Lymph Node SURGICAL PATHOLOGY (Canceled) Emily Marie MD 5/25/2023 1440    B : LEVEL 8 L LYMPH NODE Tissue Lymph Node SURGICAL PATHOLOGY (Canceled) Emily Marie MD 5/25/2023 1442    C : LEVEL 10 L LYMPH NODE Tissue Lymph Node SURGICAL PATHOLOGY (Canceled) Emily Marie MD 5/25/2023 1519    D :  LEFT  UPPER LOBE BRONCHIAL MARGIN Tissue Tissue SURGICAL PATHOLOGY (Canceled) Emily Marie MD 5/25/2023 1624    E : LEFT LEVEL 5 AND 6 LYMPH NODE  Tissue Tissue SURGICAL PATHOLOGY (Canceled) Emily Marie MD 5/25/2023 1638    F : LEVEL 7 LY MPH NODE  Tissue Tissue SURGICAL PATHOLOGY (Canceled) Emily Marie MD 5/25/2023 1639        Findings: Left upper lobectomy no metastatic disease could be identified significant amount of adhesion. Detailed Description of Procedure:     Patient was taken to the operating room after informative consent was obtained lying supine under general anesthesia ET tube was placed with no difficulty bronchoscopy was performed confirmed the double-lumen tube placement. Access was placed by anesthesia with no complications. Patient was turned left side up prepped and draped in standard fashion pause for the cause was done in standard manner thoracotomy was performed muscle-sparing latissimus dorsi and serratus anterior.   We

## 2023-05-31 NOTE — PLAN OF CARE
Problem: Discharge Planning  Goal: Discharge to home or other facility with appropriate resources  Outcome: Progressing     Problem: Pain  Goal: Verbalizes/displays adequate comfort level or baseline comfort level  Outcome: Progressing     Problem: Safety - Adult  Goal: Free from fall injury  Outcome: Progressing     Problem: Skin/Tissue Integrity  Goal: Absence of new skin breakdown  Description: 1. Monitor for areas of redness and/or skin breakdown  2. Assess vascular access sites hourly  3. Every 4-6 hours minimum:  Change oxygen saturation probe site  4. Every 4-6 hours:  If on nasal continuous positive airway pressure, respiratory therapy assess nares and determine need for appliance change or resting period. Outcome: Progressing  Note: Patients skin is intact and no sign of skin breakdown. Patient has sacral heart placed. Encouraged to turn/shift weight to avoid skin breakdown. Patient turned q2h with pillow support.  Will continue to monitor

## 2023-05-31 NOTE — CARE COORDINATION
Received call from [address] [place] [address] who states that they will continue to follow the Pt at this time. pt still confused. S/p Bronch. SLP ordered. Will follow.

## 2023-05-31 NOTE — PROCEDURES
Approximation: All  Reduced Tongue Base Retraction:  All  Deep Penetration Before: Thin tsp  Deep Penetration During: Thin straw   Partial Retrieval (deep): Thin straw   Aspiration Before: Thin tsp  Trace Aspiration: Thin tsp  Delayed Cough Reflex: Thin tsp  Weak Cough Reflex: Thin tsp  Pharyngeal Residue - Valleculae: All  Pharyngeal Residue - Pyriform: All  Pharyngeal Wall - Weakness: All  Fatigue of Mechanism: All  Pharyngeal phase comment: Deep penetration and subsequent trace aspiration observed before the swallow with initial PO trial (thin liquid via tsp). Pt demonstrated delayed, weak, and ineffective cough that did not successfully clear aspirated material.  Deep penetration (to level of the vocal folds) observed during the swallow with thin liquid via straw that only partially cleared, no aspiration. No penetration/aspiration noted with thin liquid trials via single cup sip, mildly thick (nectar) via cup, or solid PO trials. Consistent min-mod residue noted at valleculae post-swallow with all PO, min at pyriforms. Residue at valleculae increased to mod/mod-severe with puree and severe with regular solid trials. Strategies of effortful swallow, double swallow, chin tuck post-swallow, and liquid wash were all attempted in an effort to clear pharyngeal residue (successfully cleared >50% of residue).           Esophageal Phase  Impaired  Upper Esophageal Screen- Major Contributing Deficits  Decreased Esophageal Peristalsis: All; minimal retention of PO noted at times at level of cervical esophagus      Aspiration Scale    1 Material does not enter the airway    2 Material enters the airway, remains above the vocal folds, and is ejected from the airway    3 Material enters the airway, remains above the vocal folds, and is not ejected from the airway    4 Material enters the airway, contacts the vocal folds, an is ejected from the airway    5 Material enters the airway, contacts the vocal folds, and is not

## 2023-05-31 NOTE — CARE COORDINATION
Patient accepted for ARU when medically ready. VM left for CM,  will need rapid Covid prior to admit.  Dominique Sams RN

## 2023-05-31 NOTE — CARE COORDINATION
ARU continues to follow for medical improvement and ability to tolerate 3 hours of therapy/day. Pt was still confused this am. Discussed with ADRIENNE Fuchs.  Lilia James RN

## 2023-06-01 ENCOUNTER — HOSPITAL ENCOUNTER (INPATIENT)
Age: 68
DRG: 949 | End: 2023-06-01
Attending: STUDENT IN AN ORGANIZED HEALTH CARE EDUCATION/TRAINING PROGRAM | Admitting: STUDENT IN AN ORGANIZED HEALTH CARE EDUCATION/TRAINING PROGRAM
Payer: MEDICARE

## 2023-06-01 ENCOUNTER — APPOINTMENT (OUTPATIENT)
Dept: GENERAL RADIOLOGY | Age: 68
DRG: 163 | End: 2023-06-01
Attending: THORACIC SURGERY (CARDIOTHORACIC VASCULAR SURGERY)
Payer: MEDICARE

## 2023-06-01 VITALS
WEIGHT: 141.98 LBS | TEMPERATURE: 97.5 F | DIASTOLIC BLOOD PRESSURE: 62 MMHG | HEIGHT: 71 IN | RESPIRATION RATE: 20 BRPM | BODY MASS INDEX: 19.88 KG/M2 | OXYGEN SATURATION: 98 % | SYSTOLIC BLOOD PRESSURE: 124 MMHG | HEART RATE: 96 BPM

## 2023-06-01 PROBLEM — Z90.2 S/P LOBECTOMY OF LUNG: Status: ACTIVE | Noted: 2023-06-01

## 2023-06-01 LAB
ANION GAP SERPL CALCULATED.3IONS-SCNC: 18 MMOL/L (ref 3–16)
ANISOCYTOSIS BLD QL SMEAR: ABNORMAL
BASOPHILS # BLD: 0 K/UL (ref 0–0.2)
BASOPHILS NFR BLD: 0 %
BUN SERPL-MCNC: 13 MG/DL (ref 7–20)
CALCIUM SERPL-MCNC: 7.9 MG/DL (ref 8.3–10.6)
CHLORIDE SERPL-SCNC: 98 MMOL/L (ref 99–110)
CO2 SERPL-SCNC: 19 MMOL/L (ref 21–32)
CREAT SERPL-MCNC: 0.7 MG/DL (ref 0.8–1.3)
DEPRECATED RDW RBC AUTO: 17 % (ref 12.4–15.4)
EOSINOPHIL # BLD: 0.1 K/UL (ref 0–0.6)
EOSINOPHIL NFR BLD: 1 %
GFR SERPLBLD CREATININE-BSD FMLA CKD-EPI: >60 ML/MIN/{1.73_M2}
GLUCOSE SERPL-MCNC: 99 MG/DL (ref 70–99)
HCT VFR BLD AUTO: 25.5 % (ref 40.5–52.5)
HGB BLD-MCNC: 8.5 G/DL (ref 13.5–17.5)
LYMPHOCYTES # BLD: 1 K/UL (ref 1–5.1)
LYMPHOCYTES NFR BLD: 13 %
MAGNESIUM SERPL-MCNC: 1.9 MG/DL (ref 1.8–2.4)
MCH RBC QN AUTO: 33.6 PG (ref 26–34)
MCHC RBC AUTO-ENTMCNC: 33.3 G/DL (ref 31–36)
MCV RBC AUTO: 101.1 FL (ref 80–100)
MONOCYTES # BLD: 0.6 K/UL (ref 0–1.3)
MONOCYTES NFR BLD: 8 %
MYELOCYTES NFR BLD MANUAL: 2 %
NEUTROPHILS # BLD: 6.2 K/UL (ref 1.7–7.7)
NEUTROPHILS NFR BLD: 61 %
NEUTS BAND NFR BLD MANUAL: 15 % (ref 0–7)
PLATELET # BLD AUTO: 333 K/UL (ref 135–450)
PLATELET BLD QL SMEAR: ADEQUATE
PMV BLD AUTO: 6.5 FL (ref 5–10.5)
POLYCHROMASIA BLD QL SMEAR: ABNORMAL
POTASSIUM SERPL-SCNC: 3.2 MMOL/L (ref 3.5–5.1)
RBC # BLD AUTO: 2.52 M/UL (ref 4.2–5.9)
SARS-COV-2 RDRP RESP QL NAA+PROBE: NOT DETECTED
SLIDE REVIEW: ABNORMAL
SODIUM SERPL-SCNC: 135 MMOL/L (ref 136–145)
WBC # BLD AUTO: 7.9 K/UL (ref 4–11)

## 2023-06-01 PROCEDURE — 6370000000 HC RX 637 (ALT 250 FOR IP): Performed by: NURSE PRACTITIONER

## 2023-06-01 PROCEDURE — 94640 AIRWAY INHALATION TREATMENT: CPT

## 2023-06-01 PROCEDURE — 85025 COMPLETE CBC W/AUTO DIFF WBC: CPT

## 2023-06-01 PROCEDURE — 80048 BASIC METABOLIC PNL TOTAL CA: CPT

## 2023-06-01 PROCEDURE — 94669 MECHANICAL CHEST WALL OSCILL: CPT

## 2023-06-01 PROCEDURE — 2580000003 HC RX 258: Performed by: NURSE PRACTITIONER

## 2023-06-01 PROCEDURE — 6370000000 HC RX 637 (ALT 250 FOR IP): Performed by: THORACIC SURGERY (CARDIOTHORACIC VASCULAR SURGERY)

## 2023-06-01 PROCEDURE — 1280000000 HC REHAB R&B

## 2023-06-01 PROCEDURE — 71045 X-RAY EXAM CHEST 1 VIEW: CPT

## 2023-06-01 PROCEDURE — 6360000002 HC RX W HCPCS: Performed by: NURSE PRACTITIONER

## 2023-06-01 PROCEDURE — 6360000002 HC RX W HCPCS: Performed by: THORACIC SURGERY (CARDIOTHORACIC VASCULAR SURGERY)

## 2023-06-01 PROCEDURE — 36415 COLL VENOUS BLD VENIPUNCTURE: CPT

## 2023-06-01 PROCEDURE — 2580000003 HC RX 258: Performed by: THORACIC SURGERY (CARDIOTHORACIC VASCULAR SURGERY)

## 2023-06-01 PROCEDURE — 87635 SARS-COV-2 COVID-19 AMP PRB: CPT

## 2023-06-01 PROCEDURE — 83735 ASSAY OF MAGNESIUM: CPT

## 2023-06-01 RX ORDER — LANOLIN ALCOHOL/MO/W.PET/CERES
400 CREAM (GRAM) TOPICAL DAILY
Status: DISPENSED | OUTPATIENT
Start: 2023-06-02

## 2023-06-01 RX ORDER — ONDANSETRON 4 MG/1
4 TABLET, ORALLY DISINTEGRATING ORAL EVERY 8 HOURS PRN
Status: DISCONTINUED | OUTPATIENT
Start: 2023-06-01 | End: 2023-06-09

## 2023-06-01 RX ORDER — BISACODYL 10 MG
10 SUPPOSITORY, RECTAL RECTAL DAILY PRN
Status: ACTIVE | OUTPATIENT
Start: 2023-06-01

## 2023-06-01 RX ORDER — LANOLIN ALCOHOL/MO/W.PET/CERES
400 CREAM (GRAM) TOPICAL DAILY
Status: DISCONTINUED | OUTPATIENT
Start: 2023-06-01 | End: 2023-06-01 | Stop reason: HOSPADM

## 2023-06-01 RX ORDER — LANOLIN ALCOHOL/MO/W.PET/CERES
400 CREAM (GRAM) TOPICAL DAILY
Status: CANCELLED | OUTPATIENT
Start: 2023-06-01

## 2023-06-01 RX ORDER — POTASSIUM CHLORIDE 20 MEQ/1
40 TABLET, EXTENDED RELEASE ORAL ONCE
Status: DISCONTINUED | OUTPATIENT
Start: 2023-06-01 | End: 2023-06-01

## 2023-06-01 RX ORDER — SODIUM CHLORIDE FOR INHALATION 3 %
4 VIAL, NEBULIZER (ML) INHALATION 2 TIMES DAILY
Status: CANCELLED | OUTPATIENT
Start: 2023-06-01

## 2023-06-01 RX ORDER — ALBUTEROL SULFATE 2.5 MG/3ML
2.5 SOLUTION RESPIRATORY (INHALATION) EVERY 4 HOURS PRN
Status: ACTIVE | OUTPATIENT
Start: 2023-06-01

## 2023-06-01 RX ORDER — POTASSIUM CHLORIDE 20 MEQ/1
40 TABLET, EXTENDED RELEASE ORAL ONCE
Status: DISCONTINUED | OUTPATIENT
Start: 2023-06-01 | End: 2023-06-09

## 2023-06-01 RX ORDER — SODIUM CHLORIDE FOR INHALATION 3 %
4 VIAL, NEBULIZER (ML) INHALATION 2 TIMES DAILY
Status: DISCONTINUED | OUTPATIENT
Start: 2023-06-01 | End: 2023-06-01

## 2023-06-01 RX ORDER — DIAZEPAM 5 MG/1
5 TABLET ORAL EVERY 8 HOURS PRN
Status: ON HOLD | COMMUNITY
Start: 2023-05-11 | End: 2023-08-09

## 2023-06-01 RX ORDER — OXYCODONE HYDROCHLORIDE 5 MG/1
5 TABLET ORAL EVERY 4 HOURS PRN
Status: DISPENSED | OUTPATIENT
Start: 2023-06-01

## 2023-06-01 RX ORDER — BISACODYL 10 MG
10 SUPPOSITORY, RECTAL RECTAL DAILY PRN
Status: CANCELLED | OUTPATIENT
Start: 2023-06-01

## 2023-06-01 RX ORDER — PANTOPRAZOLE SODIUM 40 MG/1
40 TABLET, DELAYED RELEASE ORAL
Status: DISPENSED | OUTPATIENT
Start: 2023-06-02

## 2023-06-01 RX ORDER — ENOXAPARIN SODIUM 100 MG/ML
40 INJECTION SUBCUTANEOUS DAILY
Status: DISPENSED | OUTPATIENT
Start: 2023-06-02

## 2023-06-01 RX ORDER — ONDANSETRON 4 MG/1
4 TABLET, ORALLY DISINTEGRATING ORAL EVERY 8 HOURS PRN
Status: CANCELLED | OUTPATIENT
Start: 2023-06-01

## 2023-06-01 RX ORDER — OXYCODONE HYDROCHLORIDE 5 MG/1
5 TABLET ORAL EVERY 4 HOURS PRN
Status: CANCELLED | OUTPATIENT
Start: 2023-06-01

## 2023-06-01 RX ORDER — SODIUM CHLORIDE FOR INHALATION 3 %
4 VIAL, NEBULIZER (ML) INHALATION PRN
Status: ACTIVE | OUTPATIENT
Start: 2023-06-01

## 2023-06-01 RX ORDER — AMOXICILLIN AND CLAVULANATE POTASSIUM 875; 125 MG/1; MG/1
1 TABLET, FILM COATED ORAL EVERY 12 HOURS SCHEDULED
Status: DISCONTINUED | OUTPATIENT
Start: 2023-06-02 | End: 2023-06-01 | Stop reason: HOSPADM

## 2023-06-01 RX ORDER — ALBUTEROL SULFATE 2.5 MG/3ML
2.5 SOLUTION RESPIRATORY (INHALATION)
Status: CANCELLED | OUTPATIENT
Start: 2023-06-01

## 2023-06-01 RX ORDER — OXYCODONE HYDROCHLORIDE 5 MG/1
5 TABLET ORAL EVERY 6 HOURS PRN
Qty: 28 TABLET | Refills: 0 | Status: ON HOLD | OUTPATIENT
Start: 2023-06-01 | End: 2023-06-08

## 2023-06-01 RX ORDER — AMOXICILLIN AND CLAVULANATE POTASSIUM 875; 125 MG/1; MG/1
1 TABLET, FILM COATED ORAL EVERY 12 HOURS SCHEDULED
Status: CANCELLED | OUTPATIENT
Start: 2023-06-02

## 2023-06-01 RX ORDER — POLYETHYLENE GLYCOL 3350 17 G/17G
17 POWDER, FOR SOLUTION ORAL DAILY PRN
Status: CANCELLED | OUTPATIENT
Start: 2023-06-01

## 2023-06-01 RX ORDER — AMOXICILLIN AND CLAVULANATE POTASSIUM 875; 125 MG/1; MG/1
1 TABLET, FILM COATED ORAL EVERY 12 HOURS SCHEDULED
Status: COMPLETED | OUTPATIENT
Start: 2023-06-02 | End: 2023-06-06

## 2023-06-01 RX ORDER — AMOXICILLIN AND CLAVULANATE POTASSIUM 875; 125 MG/1; MG/1
1 TABLET, FILM COATED ORAL EVERY 12 HOURS SCHEDULED
Qty: 10 TABLET | Refills: 0 | Status: ON HOLD | DISCHARGE
Start: 2023-06-02 | End: 2023-06-07

## 2023-06-01 RX ORDER — POLYETHYLENE GLYCOL 3350 17 G/17G
17 POWDER, FOR SOLUTION ORAL DAILY PRN
Status: ACTIVE | OUTPATIENT
Start: 2023-06-01

## 2023-06-01 RX ORDER — PANTOPRAZOLE SODIUM 40 MG/1
40 TABLET, DELAYED RELEASE ORAL
Status: CANCELLED | OUTPATIENT
Start: 2023-06-02

## 2023-06-01 RX ORDER — ALBUTEROL SULFATE 2.5 MG/3ML
2.5 SOLUTION RESPIRATORY (INHALATION)
Status: DISCONTINUED | OUTPATIENT
Start: 2023-06-01 | End: 2023-06-01

## 2023-06-01 RX ORDER — POTASSIUM CHLORIDE 20 MEQ/1
40 TABLET, EXTENDED RELEASE ORAL ONCE
Status: CANCELLED | OUTPATIENT
Start: 2023-06-01

## 2023-06-01 RX ORDER — ENOXAPARIN SODIUM 100 MG/ML
40 INJECTION SUBCUTANEOUS DAILY
Status: CANCELLED | OUTPATIENT
Start: 2023-06-02

## 2023-06-01 RX ORDER — LANOLIN ALCOHOL/MO/W.PET/CERES
400 CREAM (GRAM) TOPICAL DAILY
Qty: 30 TABLET | Status: ON HOLD | DISCHARGE
Start: 2023-06-01

## 2023-06-01 RX ADMIN — ENOXAPARIN SODIUM 40 MG: 100 INJECTION SUBCUTANEOUS at 10:15

## 2023-06-01 RX ADMIN — PANTOPRAZOLE SODIUM 40 MG: 40 TABLET, DELAYED RELEASE ORAL at 05:47

## 2023-06-01 RX ADMIN — ALBUTEROL SULFATE 2.5 MG: 2.5 SOLUTION RESPIRATORY (INHALATION) at 07:59

## 2023-06-01 RX ADMIN — Medication 400 MG: at 11:21

## 2023-06-01 RX ADMIN — PIPERACILLIN AND TAZOBACTAM 3375 MG: 3; .375 INJECTION, POWDER, LYOPHILIZED, FOR SOLUTION INTRAVENOUS at 04:27

## 2023-06-01 RX ADMIN — SODIUM CHLORIDE SOLN NEBU 3% 4 ML: 3 NEBU SOLN at 08:00

## 2023-06-01 RX ADMIN — PIPERACILLIN AND TAZOBACTAM 3375 MG: 3; .375 INJECTION, POWDER, LYOPHILIZED, FOR SOLUTION INTRAVENOUS at 10:17

## 2023-06-01 RX ADMIN — POTASSIUM CHLORIDE 40 MEQ: 1500 TABLET, EXTENDED RELEASE ORAL at 11:21

## 2023-06-01 RX ADMIN — ALBUTEROL SULFATE 2.5 MG: 2.5 SOLUTION RESPIRATORY (INHALATION) at 11:37

## 2023-06-01 RX ADMIN — SODIUM CHLORIDE, PRESERVATIVE FREE 10 ML: 5 INJECTION INTRAVENOUS at 10:15

## 2023-06-01 ASSESSMENT — PAIN SCALES - GENERAL: PAINLEVEL_OUTOF10: 0

## 2023-06-01 NOTE — PLAN OF CARE
Problem: Discharge Planning  Goal: Discharge to home or other facility with appropriate resources  Outcome: Progressing  Flowsheets (Taken 5/31/2023 1600 by Santos Peña RN)  Discharge to home or other facility with appropriate resources:   Identify barriers to discharge with patient and caregiver   Arrange for needed discharge resources and transportation as appropriate   Identify discharge learning needs (meds, wound care, etc)   Arrange for interpreters to assist at discharge as needed   Refer to discharge planning if patient needs post-hospital services based on physician order or complex needs related to functional status, cognitive ability or social support system     Problem: Pain  Goal: Verbalizes/displays adequate comfort level or baseline comfort level  Outcome: Progressing     Problem: Safety - Adult  Goal: Free from fall injury  Outcome: Progressing     Problem: Skin/Tissue Integrity  Goal: Absence of new skin breakdown  Description: 1. Monitor for areas of redness and/or skin breakdown  2. Assess vascular access sites hourly  3. Every 4-6 hours minimum:  Change oxygen saturation probe site  4. Every 4-6 hours:  If on nasal continuous positive airway pressure, respiratory therapy assess nares and determine need for appliance change or resting period.   Outcome: Progressing     Problem: Chronic Conditions and Co-morbidities  Goal: Patient's chronic conditions and co-morbidity symptoms are monitored and maintained or improved  Outcome: Progressing  Flowsheets (Taken 5/31/2023 1600 by Santos Peña RN)  Care Plan - Patient's Chronic Conditions and Co-Morbidity Symptoms are Monitored and Maintained or Improved: Monitor and assess patient's chronic conditions and comorbid symptoms for stability, deterioration, or improvement     Problem: Respiratory - Adult  Goal: Achieves optimal ventilation and oxygenation  Outcome: Progressing     Problem: Skin/Tissue Integrity - Adult  Goal: Skin integrity

## 2023-06-01 NOTE — DISCHARGE INSTR - COC
None to display            Nurse Assessment:  Last Vital Signs: /64   Pulse 94   Temp 97.4 °F (36.3 °C) (Oral)   Resp 20   Ht 5' 11\" (1.803 m)   Wt 141 lb 15.6 oz (64.4 kg)   SpO2 94%   BMI 19.80 kg/m²     Last documented pain score (0-10 scale): Pain Level: 0  Last Weight:   Wt Readings from Last 1 Encounters:   23 141 lb 15.6 oz (64.4 kg)     Mental Status:  {IP PT MENTAL STATUS:}    IV Access:  { [facility] IV ACCESS:207007118}    Nursing Mobility/ADLs:  Walking   {[facility] DME KZSI:955919275}  Transfer  {[facility] DME TLIO:133285881}  Bathing  {[facility] DME WBUB:033957462}  Dressing  {[facility] DME QUBU:960754150}  Toileting  {[facility] DME SKFO:521157396}  Feeding  {[facility] DME QIYC:030304165}  Med Admin  {[facility] DME IGUZ:544632384}  Med Delivery   {[facility] MED Delivery:382671116}    Wound Care Documentation and Therapy:  Incision 23 Chest Left;Lateral (Active)   Dressing Status Dry;Clean; Intact 23 0000   Incision Cleansed Soap and water;Cleansed with saline 23 0400   Dressing/Treatment Open to air 23 0400   Closure Surgical glue 23 0400   Margins Approximated 23 0600   Incision Assessment Other (Comment) 23 1600   Drainage Amount None 23 0400   Drainage Description Serous 23 0600   Odor None 23 0400   Quiana-incision Assessment Intact 23 0400   Number of days: 6        Elimination:  Continence: Bowel: {YES / VY:48983}  Bladder: {YES / O}  Urinary Catheter: {Urinary Catheter:710136403}   Colostomy/Ileostomy/Ileal Conduit: {YES / N}       Date of Last BM: ***    Intake/Output Summary (Last 24 hours) at 2023 0922  Last data filed at 2023 1557  Gross per 24 hour   Intake 555.22 ml   Output 710 ml   Net -154.78 ml     I/O last 3 completed shifts:   In: 555.2 [P.O.:450; IV Piggyback:105.2]  Out: 1660 [Urine:1660]    Safety Concerns:     [address] Safety Concerns:[id number]}    Impairments/Disabilities:      [address]

## 2023-06-01 NOTE — CARE COORDINATION
LOS 7. Care managed by CV surg. S/P [facility]. From home w spouse. ARU has accepted and could take pt today if medically ready. No cert will be needed, but will need rapid covid. Gilford Peach, RN         CASE MANAGEMENT DISCHARGE SUMMARY      Discharge to: ARU    Precertification completed: [facility] Exemption Notification (HENS) completed: NA    IMM given: (date) 5/30    New Durable Medical Equipment ordered/agency: NA    Transportation: PER STAFF       Confirmed discharge plan with:     Patient: yes     Family:  yes SPOUSE ON PHONE     Facility/Agency, name:  [facility] faxed   Phone number for report to facility: [phone]     RN, name: Najma Orozconorman    Note: Discharging nurse to complete [facility], reconcile [facility], and place final copy with patient's discharge packet. RN to ensure that written prescriptions for  Level II medications are sent with patient to the facility as per protocol.      Gilford Peach, RN

## 2023-06-01 NOTE — PROGRESS NOTES
Assessment and medications completed. Patient is currently on 4L nasal cannula with sats maintaining above 92%. Frequent oral and mouth care performed. Incentive spirometer and flutter valved used frequently to assist patients breathing. Denies pain at this time. Patient is intermittently confused.
CVTS Thoracic Progress Note:          CC:  Post op follow up 5/25/23 OPEN LEFT UPPER LOBECTOMY WITH SLEEVE RESECTION OF PULMONARY ARTERY AND BRONCHUS WITH MUSCLE FLAP CRYOABLATION AND INTERCOSTAL NERVE BLOCK     Subj: awake, sitting up in bed in nad. Saturating 94% on 1 L per NC    Obj:    Blood pressure 126/62, pulse (!) 112, temperature 97.9 °F (36.6 °C), temperature source Oral, resp. rate 18, height 5' 11\" (1.803 m), weight 147 lb 4.3 oz (66.8 kg), SpO2 94 %. Slightly diminished on left, scattered rhonchi   Abdomen soft, non-tender   Incision w/ occlusive dressing, CDI   UOP satisfactory in 24 hrs; +BM 5/30    Diagnostics:   Recent Labs     05/30/23  0553   WBC 9.8   HGB 8.4*   HCT 24.1*                                                                     Recent Labs     05/30/23  0553   *   K 3.5   CL 98*   CO2 24   BUN 10   CREATININE 0.7*   GLUCOSE 110*        Surgical path will be reviewed at his follow up appointment. CXR: 5/30  IMPRESSION:  Persistent multifocal bilateral consolidation, with aeration slightly  improved on the left as compared to prior. Suspected small left apical pneumothorax, similar to prior. Assess/Plan:   KYLE pulmonary nodule: s/p EVERETT lobectomy  -known squamous cell carcinoma. Underwent 4 rounds of chemo w/ immunotherapy prior to meeting our team. [place] [facility] [place]    Acute post op pulmonary insufficiency:   -oxygen requirement increased over the weekend  -Pulm performed a bronchoscopy 5/30, did not have much in the way of mucous plugging.    -changing mucomyst to 3% nebs and adding prophylactic Zosyn.   -pulm expansion-is, oobtc, pt/ot and ambulation.   -Prophy: lovenox and pepcid  -will consult IPR to have them get the process started.   ________________________________________________________________    JOEL Jay - CNP  5/31/2023  9:55 AM
CVTS Thoracic Progress Note:          CC:  Post op follow up 5/25/23 OPEN LEFT UPPER LOBECTOMY WITH SLEEVE RESECTION OF PULMONARY ARTERY AND BRONCHUS WITH MUSCLE FLAP CRYOABLATION AND INTERCOSTAL NERVE BLOCK     Subj: awake, sitting up in bed in nad. Saturating 94% on 6 L per NC    Obj:    Blood pressure (!) 124/58, pulse (!) 108, temperature 98.3 °F (36.8 °C), temperature source Oral, resp. rate 18, height 5' 11\" (1.803 m), weight 137 lb 2 oz (62.2 kg), SpO2 98 %. Lungs diminished on left   Abdomen soft, non-tender   Incision w/ occlusive dressing, CDI   UOP satisfactory in 24 hrs; +BM 5/30    Diagnostics:   Recent Labs     05/28/23  0420 05/30/23  0553   WBC 10.4 9.8   HGB 8.7* 8.4*   HCT 25.3* 24.1*    291                                                                  Recent Labs     05/28/23  0420 05/30/23  0553   * 134*   K 3.8 3.5   CL 98* 98*   CO2 25 24   BUN 15 10   CREATININE 0.8 0.7*   GLUCOSE 136* 110*        Surgical path will be reviewed at his follow up appointment. CXR: 5/30  IMPRESSION:  Persistent multifocal bilateral consolidation, with aeration slightly  improved on the left as compared to prior. Suspected small left apical pneumothorax, similar to prior. Assess/Plan:   KYLE pulmonary nodule: s/p EVERETT lobectomy  -known squamous cell carcinoma.  Underwent 4 rounds of chemo w/ immunotherapy prior to meeting our team. (Last treatment 4/6 w/ [address])  -oxygen requirement increased over the weekend  -consulted Pulm for bronchoscopy, appreciate assistance.   -had some intermittent confusion over the weekend, will d/c kimber  -pulm expansion-is, oobtc, pt/ot and ambulation.   -Prophy: lovenox and pepcid  -will consult IPR to have them get the process started.   ________________________________________________________________    JOEL Lima - CNP  5/30/2023  9:53 AM
Marj Campbell NP notified of the Patient's low urine output over the course of the day. 200ml in 9hrs. The Patient has had poor intake all day. RN instructed to increase the Patient's intake. No further interventions at this time.
Occupational Therapy  Facility/Department: [facility] C2 CARD TELEMETRY  Daily Treatment Note  NAME: Colby Gu  : 1955  MRN: [id number]    Date of Service: 2023    Discharge Recommendations:  IP Rehab  OT Equipment Recommendations  Equipment Needed: No    Therapy discharge recommendations take into account each patient's current medical complexities and are subject to input/oversight from the patient's healthcare team.     Barriers to Home Discharge:   [x] Steps to access home entry or bed/bath:   [] Unable to transfer, ambulate, or propel wheelchair household distances without assist   [x] Limited available assist at home upon discharge    [x] Patient or family requests d/c to post-acute facility    [x] Poor cognition/safety awareness for d/c to home alone   [] Unable to maintain ordered weight bearing status    [] Patient with salient signs of long-standing immobility   [x] Other: decreased functional independence and decreased safety    If pt is unable to be seen after this session, please let this note serve as discharge summary. Please see case management note for discharge disposition. Thank you. Patient Diagnosis(es): The encounter diagnosis was Lung nodule. AM-PAC score  AM-PAC Inpatient Daily Activity Raw Score: 15 (23)  -PAC Inpatient ADL T-Scale Score : 34.69 (23)  ADL Inpatient CMS 0-100% Score: 56.46 (23)  ADL Inpatient CMS G-Code Modifier : CK (23)    Assessment    Assessment: Pt recieved for therapy alert and in chair. Pt oriented x4 and agreeable to therapy. Completed functional transfer from chair>wheelchair with min Ax2 for stability and safety. Required mod A with donning new brief. Able to thread BLEs through pant holes and able to pull them up from thighs while standing. Pt will continue to benefit from skilled OT interventions to increase functional independence and increase safety.  Reccommending SNF to continue skilled therapy
Occupational Therapy/ Physical Therapy    Pt attempted to be seen this day however after chart review and speaking with RN, pt had bronchoscopy this day and is lethargic from sedation. Per RN, Whit Brunner, pt to be medical hold this day.  POC will be continued as pt is agreeable and stable    Thank you    [employer], OTR/L  Jacek Ramos, DPT
Patient alert and oriented x4 and up in the chair via the lift system with night shift. Vitals stable. The Patient has his call light within reach. The Patient denies further needs at this time.
Patient transferred to chair via lift. Patient tolerated well. Denies pain at this time. Will continue to monitor.
Patient: Saad Davis  [id number]  Date: 5/31/2023      Chief Complaint: KYLE squamous cell carcinoma    History of Present Illness/Hospital Course:  Clarissa Hong is a 74year old male with a past medical history significant for KYLE squamous cell carcinoma s/p 4 rounds of chemo with immunotherapy, COPD, former tobacco use, and HLD who [facility] for open left upper lobectomy with sleeve resection of pulmonary artery and bronchus with muscle flap cryoablation and intercostal nerve block. Post operative course has been notable for increased oxygen requirement. Pulmonology is consulted and on 5/30 he underwent bronchoscopy. He continues to have functional deficits below his baseline. Today Maurice Cooks is seen in his room with nursing present. He appears confused and per nursing is still coming off of fentanyl and versed used for bronchoscopy. Will return to see patient tomorrow. Interval History:  MBS today showing aspiration and patient is now on a pureed diet. Today Maurice Cooks is seen without family present. He appears less confused today. Discussed inpatient rehabilitation and he is interested. has a past medical history of Chemotherapy-induced nausea, COPD (chronic obstructive pulmonary disease) ([address] Diverticulosis, Former smoker, Hyperlipidemia, and Squamous cell carcinoma lung, left ([address] has a past surgical history that includes Ankle fracture surgery (Right, 2011); Colonoscopy; bronchoscopy; Tunneled venous port placement (Right); Lung removal, total (Left, 5/25/2023); and bronchoscopy (N/A, 5/30/2023). reports that he quit smoking about 11 months ago. His smoking use included cigarettes. He has a 50.00 pack-year smoking history. He has been exposed to tobacco smoke. He has never used smokeless tobacco. He reports that he does not currently use alcohol. He reports current drug use. Drug: Marijuana [place]).     family history includes Cancer in his brother, mother, sibling, and
Progressive Care Progress Note    Patient: Emeka Marr MRN: [id number]  Date of  Admission: 5/25/2023   YOB: 1955  Age: 74 y.o. Sex: male    Unit: [place] C2 CVU  Room/Bed: 0224/0224-01 Attending Physician: Amalia Bradley MD   Admitting Physician: Laura Jules          No chief complaint on file. History Obtained From   patient, electronic medical record     History of Present Illness   This is a 63-year-old gentleman who has had a open left upper lobectomy with sleep resection of pulmonary artery and bronchus with muscle flap cryoablation and intercostal nerve block. Surgery was done 5/25/2023. Pulmonary is being asked to see the patient for potential bronchoscopy as the patient is having more congestion. Patient is undergoing pulmonary toilet with vest therapy but does not feel like he gets enough mucus out. Still feeling congested    Subjective: Tolerated bronchoscopy yesterday  However was very confused during the most of the day  More alert and calm and relaxed today  Breathing little easier today  No chest pains or palpitations  No nausea vomiting    ROS:A comprehensive review of systems was negative except for: Above    Objective: Intake and Output:   Current Shift:   No intake/output data recorded.   Last three shifts:   05/29 1901 - 05/31 0700  In: -   Out: 950 [Urine:950]        Hemodynamic parameters for last 24 hours:  [unfilled]    Physical Exam:   Patient Vitals for the past 24 hrs:   BP Temp Temp src Pulse Resp SpO2 Weight   05/31/23 1000 122/66 -- -- (!) 113 -- 95 % --   05/31/23 0900 (!) 130/59 98.5 °F (36.9 °C) Oral (!) 109 20 93 % --   05/31/23 0830 -- -- -- (!) 112 -- 94 % --   05/31/23 0800 126/62 -- -- (!) 112 -- 92 % --   05/31/23 0700 130/65 -- -- (!) 110 -- 99 % --   05/31/23 0600 132/66 -- -- (!) 107 -- -- --   05/31/23 0500 136/65 -- -- (!) 111 -- 98 % --   05/31/23 0400 127/64 97.9 °F (36.6 °C) Oral (!) 103 -- 98 % 147 lb 4.3 oz (66.8 kg)
RN updated the patient's wife Becca Vincent on the patient's plan of care and that he will be discharged to the ARU at 1300 today.      Electronically signed by Jonathon Silvestre RN on 6/1/2023 at 12:05 PM
Shift: [id number]    Procedure: OPEN LEFT UPPER LOBECTOMY WITH SLEEVE RESECTION OF PULMONARY ARTERY AND BRONCHUS WITH MUSCLE FLAP CRYOABLATION AND INTERCOSTAL NERVE BLOCK     Nursing assessment at handoff  stable    Rhythm NSR/ST    Urinary Output >30ml/hr yes    Crepitus no     Changes in O2 requirements Oxygen Therapy  SpO2: 96 %  Pulse Oximetry Type: Continuous  Pulse via Oximetry: 96 beats per minute  SPO2 High Alarm Limit: 100  SPO2 Low Alarm Limit POX: 89  Pulse Oximeter Device Mode: Continuous  Pulse Oximeter Device Location: Finger  O2 Device: Nasal cannula  Oximetry Probe Site Changed: Yes  Skin Assessment: Clean, dry, & intact  O2 Flow Rate (L/min): 4 L/min  Oxygen Therapy: None (Room air)  Most recent vitals BP (!) 116/58   Pulse (!) 107   Temp 98.5 °F (36.9 °C) (Oral)   Resp 18   Ht 5' 11\" (1.803 m)   Wt 151 lb 14.4 oz (68.9 kg)   SpO2 96%   BMI 21.19 kg/m²       Admission weight Weight - Scale: 150 lb (68 kg) Today's weight   Wt Readings from Last 1 Encounters:   05/29/23 151 lb 14.4 oz (68.9 kg)          Lines/Drains  LDA Insertion Date Date Changed (if needed) Discontinued Date   IV 5/25     Central Line      Gaviria 5/25 5/26 5/26   Chest Tube 5/25 05/27 5/27       Interventions   Problem(Brief) Date Time Intervention Physician contacted                                          Drip rates at handoff:    morphine Stopped (05/27/23 1930)    dextrose 5% and 0.45% NaCl with KCl 20 mEq Stopped (05/26/23 0741)    sodium chloride         Hospital Course:  POD# 0  -Large left pneumothorax   - Nasal tracheal suction with Dr. Anh Cyr with little to no success  - Mucomyst and albuterol given per RT  -CT output: 259 mL  -Urine output: 125 mL     POD #0 (nights)  - repeat chest xray showed \"Near complete resorption of the large left pneumothorax\"  - morphine for pain  - ulises is positional and does not draw  - chest tube output= 360 ml  - urine output= 1375 ml    POD #1 (nights)  - gaviria and ulises removed
Shift: [id number]     Procedure: OPEN LEFT UPPER LOBECTOMY WITH SLEEVE RESECTION OF PULMONARY ARTERY AND BRONCHUS WITH MUSCLE FLAP CRYOABLATION AND INTERCOSTAL NERVE BLOCK      Nursing assessment at handoff  stable     Rhythm NSR/ST     Urinary Output >30ml/hr yes     Crepitus no      Changes in O2 requirements Oxygen Therapy  SpO2: 95 %  Pulse Oximetry Type: Continuous  Pulse via Oximetry: 96 beats per minute  SPO2 High Alarm Limit: 100  SPO2 Low Alarm Limit POX: 89  Pulse Oximeter Device Mode: Continuous  Pulse Oximeter Device Location: Finger  O2 Device: Nasal cannula  Oximetry Probe Site Changed: Yes  Skin Assessment: Clean, dry, & intact  O2 Flow Rate (L/min): 3 L/min  Oxygen Therapy: None (Room air)  Most recent vitals /64 (81)   Pulse (103)   Temp 98.5 °F (36.9 °C) (Oral)   Resp 18   Ht 5' 11\" (1.803 m)     Admission weight Weight - Scale: 150 lb (68 kg) Today's weight       Wt Readings from Last 1 Encounters:   05/30/23 05/31/23 137 lb 2 oz (62.2 kg)  (67.8kg)           Lines/Drains  LDA Insertion Date Date Changed (if needed) Discontinued Date   IV 5/25       Central Line         Gaviria 5/25 5/26 5/26   Chest Tube 5/25 05/27 5/27        Interventions             Problem(Brief) Date Time Intervention Physician contacted                                                                     Drip rates at handoff: Infusions Meds    sodium chloride              Hospital Course:  POD# 0  -Large left pneumothorax   - Nasal tracheal suction with Dr. Singh Favors with little to no success  - Mucomyst and albuterol given per RT  -CT output: 259 mL  -Urine output: 125 mL     POD #0 (nights)  - repeat chest xray showed \"Near complete resorption of the large left pneumothorax\"  - morphine for pain  - ulises is positional and does not draw  - chest tube output= 360 ml  - urine output= 1375 ml     POD #1 (nights)  - gaviria and ulises removed during day  - PCA pump added during day.  Pt used as needed during night  -
Shift: [id number]    Procedure: OPEN LEFT UPPER LOBECTOMY WITH SLEEVE RESECTION OF PULMONARY ARTERY AND BRONCHUS WITH MUSCLE FLAP CRYOABLATION AND INTERCOSTAL NERVE BLOCK     Nursing assessment at handoff  stable    Rhythm NSR/ST    Urinary Output >30ml/hr no    Crepitus no     Changes in O2 requirements Oxygen Therapy  SpO2: 97 %  Pulse Oximetry Type: Continuous  Pulse via Oximetry: 96 beats per minute  SPO2 High Alarm Limit: 100  SPO2 Low Alarm Limit POX: 89  Pulse Oximeter Device Mode: Continuous  Pulse Oximeter Device Location: Finger  O2 Device: Nasal cannula  Oximetry Probe Site Changed: Yes  Skin Assessment: Clean, dry, & intact  O2 Flow Rate (L/min): 5 L/min  Oxygen Therapy: None (Room air)  Most recent vitals /61   Pulse 99   Temp 98 °F (36.7 °C) (Oral)   Resp 18   Ht 5' 11\" (1.803 m)   Wt 151 lb 14.4 oz (68.9 kg)   SpO2 97%   BMI 21.19 kg/m²       Admission weight Weight - Scale: 150 lb (68 kg) Today's weight   Wt Readings from Last 1 Encounters:   05/29/23 151 lb 14.4 oz (68.9 kg)          Lines/Drains  LDA Insertion Date Date Changed (if needed) Discontinued Date   IV 5/25     Central Line      Gaviria 5/25 5/26 5/26   Chest Tube 5/25 05/27 5/27       Interventions   Problem(Brief) Date Time Intervention Physician contacted                                          Drip rates at handoff:    morphine Stopped (05/27/23 1930)    dextrose 5% and 0.45% NaCl with KCl 20 mEq Stopped (05/26/23 0741)    sodium chloride         Hospital Course:  POD# 0  -Large left pneumothorax   - Nasal tracheal suction with Dr. Magdalena Tristan with little to no success  - Mucomyst and albuterol given per RT  -CT output: 259 mL  -Urine output: 125 mL     POD #0 (nights)  - repeat chest xray showed \"Near complete resorption of the large left pneumothorax\"  - morphine for pain  - ulises is positional and does not draw  - chest tube output= 360 ml  - urine output= 1375 ml    POD #1 (nights)  - gaviria and ulises removed during day  -
Shift: [id number]     Procedure: OPEN LEFT UPPER LOBECTOMY WITH SLEEVE RESECTION OF PULMONARY ARTERY AND BRONCHUS WITH MUSCLE FLAP CRYOABLATION AND INTERCOSTAL NERVE BLOCK      Nursing assessment at handoff  stable     Rhythm NSR/ST     Urinary Output >30ml/hr yes     Crepitus no      Changes in O2 requirements Oxygen Therapy  SpO2: 95 %  Pulse Oximetry Type: Continuous  Pulse via Oximetry: 96 beats per minute  SPO2 High Alarm Limit: 100  SPO2 Low Alarm Limit POX: 89  Pulse Oximeter Device Mode: Continuous  Pulse Oximeter Device Location: Finger  O2 Device: Nasal cannula  Oximetry Probe Site Changed: Yes  Skin Assessment: Clean, dry, & intact  O2 Flow Rate (L/min): 3 L/min  Oxygen Therapy: None (Room air)  Most recent vitals /64 (81)   Pulse (103)   Temp 98.5 °F (36.9 °C) (Oral)   Resp 18   Ht 5' 11\" (1.803 m)     Admission weight Weight - Scale: 150 lb (68 kg) Today's weight       Wt Readings from Last 1 Encounters:   05/30/23 05/31/23 137 lb 2 oz (62.2 kg)  (67.8kg)           Lines/Drains  LDA Insertion Date Date Changed (if needed) Discontinued Date   IV 5/25       Central Line         Gaviria 5/25 5/26 5/26   Chest Tube 5/25 05/27 5/27        Interventions             Problem(Brief) Date Time Intervention Physician contacted                                                                     Drip rates at handoff: Infusions Meds    sodium chloride              Hospital Course:  POD# 0  -Large left pneumothorax   - Nasal tracheal suction with Dr. Natasha Benavidez with little to no success  - Mucomyst and albuterol given per RT  -CT output: 259 mL  -Urine output: 125 mL     POD #0 (nights)  - repeat chest xray showed \"Near complete resorption of the large left pneumothorax\"  - morphine for pain  - ulises is positional and does not draw  - chest tube output= 360 ml  - urine output= 1375 ml     POD #1 (nights)  - gaviria and ulises removed during day  - PCA pump added during day.  Pt used as needed during night  -
Shift: [id number]    Procedure: OPEN LEFT UPPER LOBECTOMY WITH SLEEVE RESECTION OF PULMONARY ARTERY AND BRONCHUS WITH MUSCLE FLAP CRYOABLATION AND INTERCOSTAL NERVE BLOCK     Nursing assessment at handoff  stable    Rhythm NSR/ST    Urinary Output >30ml/hr yes    Crepitus no     Changes in O2 requirements Oxygen Therapy  SpO2: 95 %  Pulse Oximetry Type: Continuous  Pulse via Oximetry: 96 beats per minute  SPO2 High Alarm Limit: 100  SPO2 Low Alarm Limit POX: 89  Pulse Oximeter Device Mode: Continuous  Pulse Oximeter Device Location: Finger  O2 Device: Nasal cannula  Oximetry Probe Site Changed: Yes  Skin Assessment: Clean, dry, & intact  O2 Flow Rate (L/min): 4 L/min  Oxygen Therapy: None (Room air)  Most recent vitals /67   Pulse (!) 119   Temp 98.5 °F (36.9 °C) (Oral)   Resp 18   Ht 5' 11\" (1.803 m)   Wt 137 lb 2 oz (62.2 kg)   SpO2 95%   BMI 19.13 kg/m²       Admission weight Weight - Scale: 150 lb (68 kg) Today's weight   Wt Readings from Last 1 Encounters:   05/30/23 137 lb 2 oz (62.2 kg)          Lines/Drains  LDA Insertion Date Date Changed (if needed) Discontinued Date   IV 5/25     Central Line      Gaviria 5/25 5/26 5/26   Chest Tube 5/25 05/27 5/27       Interventions   Problem(Brief) Date Time Intervention Physician contacted                                          Drip rates at handoff:    sodium chloride         Hospital Course:  POD# 0  -Large left pneumothorax   - Nasal tracheal suction with Dr. Natasha Benavidez with little to no success  - Mucomyst and albuterol given per RT  -CT output: 259 mL  -Urine output: 125 mL     POD #0 (nights)  - repeat chest xray showed \"Near complete resorption of the large left pneumothorax\"  - morphine for pain  - ulises is positional and does not draw  - chest tube output= 360 ml  - urine output= 1375 ml    POD #1 (nights)  - gaviria and ulises removed during day  - PCA pump added during day.  Pt used as needed during night  - dressings changed and pt up to chair with no
Speech Language Pathology  Dysphagia Treatment/Follow-Up Note  Facility/Department: [facility] C2 CARD TELEMETRY      Recommendations: NPO exception of low volume ice chips pending completion of instrumental swallow study via MBSS    Risk Management: oral care q4 hrs to reduce adverse affects in the event of aspiration and general aspiration precautions. [facility]  : 1955 (74 y.o.)   MRN: [id number]  ROOM: [address] [place] [address]  ADMISSION DATE: 2023  PATIENT DIAGNOSIS(ES): Lung nodule [R91.1]  Allergies   Allergen Reactions    Codeine Nausea Only    Isothiazolinone Chloride Rash     Preservative for shelf life, itching & peeling    Tetracyclines & Related Other (See Comments) and Rash     Felt weird. Felt weird. DATE ONSET: 2023    Pain: The patient does not complain of pain     Current Diet: Diet NPO Exceptions are: Sips of Water with Meds    CXR Findings (23): IMPRESSION:  No improvement. Persistent moderate to severe airspace disease in the lungs  with a small left pleural effusion. Diet Tolerance:  Pt currently NPO. Dysphagia Treatment and Impressions:  Subjective: Pt seen in room at bedside with RN permission Jada Ojeda). Behavior / Cognition: alert, cooperative, and pleasantly confused (although was oriented to self, place, and time). RN Report/Chart Review:   New evaluation order received this date, although pt currently on caseload. Pt seen for initial BSE on  with recommendations for a regular solid diet with thin liquids. Pt NPO at this time, s/p bronchoscopy . Pt also had significant confusion yesterday s/p bronchoscopy per RN and chart. Noted pt has had fluctuating O2 demands over the last few days per chart (requiring 15 L NRB on ). See most recent CXR findings above.   Patient tolerance: pt currently NPO; RN reported pt had single sips of water with PO meds earlier this date, tolerated without concerns for aspiration     Baseline Respiratory
Speech-Language Pathology  MBSS    MBSS completed -- recommend initiating Pureed diet (IDDSI 4) with thin liquids (IDDSI 0), no straws, meds 1 at a time with thin liquid. Recommend cues for double swallow with solid PO and adherence to strict aspiration precautions. Pt would benefit from continued dysphagia tx during acute stay and after discharge. Full report to follow. RN notified of recs.      ANAIS SinghS. [id number] [facility]  Speech-language pathologist  [id number]
CNP 5/31/2023 10:01 AM
OF THE CHEST     5/27/2023 5:43 am     COMPARISON:  05/26/2023     HISTORY:  ORDERING SYSTEM PROVIDED HISTORY: evaluating atelectasis  TECHNOLOGIST PROVIDED HISTORY:  Reason for exam:->evaluating atelectasis  Reason for Exam: eval atelectasis     FINDINGS:  Right-sided subclavian venous Port-A-Cath and left-sided chest tube are  stable in position. There has been interval increase in left basilar  segmental airspace opacification. Patchy airspace disease has also developed  within the central aspect of the right upper lobe. There is no pneumothorax. Heart size and vascularity are stable. Left chest wall emphysema is similar  to prior. IMPRESSION:  Developing bilateral atelectasis versus pneumonia. Assessment:    Condition: In stable condition. Improving. (S/P Sleeve resection KYLE (Post op follow up 5/25/23 OPEN LEFT UPPER LOBECTOMY WITH SLEEVE RESECTION OF PULMONARY ARTERY AND BRONCHUS WITH MUSCLE FLAP CRYOABLATION AND INTERCOSTAL NERVE BLOCK)   - overall satisfactory progress from a cardiovascular standpoint   - the patient remains profoundly weak and did not participate well with PT   - he most likely will benefit from in-patient rehab   - CXR reveals expanded lungs bilaterally   - chest tube site shows no drainage). Plan:   Wean off oxygen. (Chest tubes Remove   - follow CXR reveals stable left apical pneumothorax   -no significant crepitance or clinical signs of respiratory concerns   - anticipate d/c  soon   - please see orders for additional details on plans   - will continue to monitor daily CXR's for now    - gave a dose of Lasix this am to address mild pulmonary congestion).      Shauna Primrose, MD  5/29/2023
clearance  Distance (ft): 10 Feet  Assistive Device: Walker, rollator;Gait belt    Safety Devices  Type of Devices: Gait belt (Pt with transport at end of session)  Restraints  Restraints Initially in Place: No       Goals  Short Term Goals  Time Frame for Short Term Goals: 6/02/23--5/31 all goals continue  Short Term Goal 1: pt will complete bed mobility with mod I  Short Term Goal 2: pt will complete transfers with mod I  Short Term Goal 3: pt will ambulate 150ft with LRAD and supervsision  Short Term Goal 4: pt will participate in 12-15 reps of BLE exercises by 5/29/23 5/29 completed with AAROM  Additional Goals?: No  Patient Goals   Patient Goals :  \"To go home\"    Education  Patient Education  Education Given To: Patient  Education Provided: Role of Therapy;Plan of Care;Equipment;Transfer Training  Education Provided Comments: patient educated to complete pursed lip breathing, educated on benefits of increased mobility, safety with rolling walker,  Education Method: Verbal  Barriers to Learning: None  Education Outcome: Verbalized understanding;Demonstrated understanding;Continued education needed    [facility] 6 Clicks Inpatient Mobility:  AM-PAC Basic Mobility - Inpatient   How much help is needed turning from your back to your side while in a flat bed without using bedrails?: A Little  How much help is needed moving from lying on your back to sitting on the side of a flat bed without using bedrails?: A Lot  How much help is needed moving to and from a bed to a chair?: A Lot  How much help is needed standing up from a chair using your arms?: A Lot  How much help is needed walking in hospital room?: A Lot  How much help is needed climbing 3-5 steps with a railing?: Total  AM-PAC Inpatient Mobility Raw Score : 12  AM-PAC Inpatient T-Scale Score : 35.33  Mobility Inpatient CMS 0-100% Score: 68.66  Mobility Inpatient CMS G-Code Modifier : CL    Therapy Time   Individual Concurrent Group Co-treatment   Time In [id number]
Goals   Patient Goals : \"To go home\"    Education  Patient Education  Education Given To: Patient  Education Provided: Role of Therapy;Plan of Care;Equipment;Transfer Training  Education Provided Comments: patient educated to complete pursed lip breathing, educated on benefits of increased mobility, safety with rolling walker, use of call bell.   Education Method: Verbal  Barriers to Learning: None  Education Outcome: Verbalized understanding;Demonstrated understanding;Continued education needed  [facility] 6 Clicks Inpatient Mobility:  AM-PAC Basic Mobility - Inpatient   How much help is needed turning from your back to your side while in a flat bed without using bedrails?: A Little  How much help is needed moving from lying on your back to sitting on the side of a flat bed without using bedrails?: A Lot  How much help is needed moving to and from a bed to a chair?: A Lot  How much help is needed standing up from a chair using your arms?: A Lot  How much help is needed walking in hospital room?: A Lot  How much help is needed climbing 3-5 steps with a railing?: Total  AM-PAC Inpatient Mobility Raw Score : 12  AM-PAC Inpatient T-Scale Score : 35.33  Mobility Inpatient CMS 0-100% Score: 68.66  Mobility Inpatient CMS G-Code Modifier : CL   Therapy Time   Individual Concurrent Group Co-treatment   Time In 0904         Time Out 0946         Minutes 42         Timed Code Treatment Minutes: Montse Henderson 1154       Eddi Catalan PT

## 2023-06-02 PROBLEM — E43 SEVERE MALNUTRITION (HCC): Status: ACTIVE | Noted: 2023-06-02

## 2023-06-02 LAB
ANION GAP SERPL CALCULATED.3IONS-SCNC: 19 MMOL/L (ref 3–16)
ANISOCYTOSIS BLD QL SMEAR: ABNORMAL
BASOPHILS # BLD: 0 K/UL (ref 0–0.2)
BASOPHILS NFR BLD: 0 %
BUN SERPL-MCNC: 11 MG/DL (ref 7–20)
CALCIUM SERPL-MCNC: 8 MG/DL (ref 8.3–10.6)
CHLORIDE SERPL-SCNC: 96 MMOL/L (ref 99–110)
CO2 SERPL-SCNC: 18 MMOL/L (ref 21–32)
CREAT SERPL-MCNC: 0.7 MG/DL (ref 0.8–1.3)
DEPRECATED RDW RBC AUTO: 16.8 % (ref 12.4–15.4)
EOSINOPHIL # BLD: 0.9 K/UL (ref 0–0.6)
EOSINOPHIL NFR BLD: 12 %
GFR SERPLBLD CREATININE-BSD FMLA CKD-EPI: >60 ML/MIN/{1.73_M2}
GLUCOSE SERPL-MCNC: 98 MG/DL (ref 70–99)
HCT VFR BLD AUTO: 22.9 % (ref 40.5–52.5)
HGB BLD-MCNC: 8 G/DL (ref 13.5–17.5)
LYMPHOCYTES # BLD: 1.1 K/UL (ref 1–5.1)
LYMPHOCYTES NFR BLD: 15 %
MCH RBC QN AUTO: 34.8 PG (ref 26–34)
MCHC RBC AUTO-ENTMCNC: 35 G/DL (ref 31–36)
MCV RBC AUTO: 99.3 FL (ref 80–100)
METAMYELOCYTES NFR BLD MANUAL: 1 %
MONOCYTES # BLD: 0.5 K/UL (ref 0–1.3)
MONOCYTES NFR BLD: 7 %
NEUTROPHILS # BLD: 4.8 K/UL (ref 1.7–7.7)
NEUTROPHILS NFR BLD: 56 %
NEUTS BAND NFR BLD MANUAL: 9 % (ref 0–7)
PLATELET # BLD AUTO: 342 K/UL (ref 135–450)
PLATELET BLD QL SMEAR: ADEQUATE
PMV BLD AUTO: 6.5 FL (ref 5–10.5)
POLYCHROMASIA BLD QL SMEAR: ABNORMAL
POTASSIUM SERPL-SCNC: 3.1 MMOL/L (ref 3.5–5.1)
RBC # BLD AUTO: 2.3 M/UL (ref 4.2–5.9)
SLIDE REVIEW: ABNORMAL
SODIUM SERPL-SCNC: 133 MMOL/L (ref 136–145)
WBC # BLD AUTO: 7.2 K/UL (ref 4–11)

## 2023-06-02 PROCEDURE — 97116 GAIT TRAINING THERAPY: CPT

## 2023-06-02 PROCEDURE — 36415 COLL VENOUS BLD VENIPUNCTURE: CPT

## 2023-06-02 PROCEDURE — 80048 BASIC METABOLIC PNL TOTAL CA: CPT

## 2023-06-02 PROCEDURE — 92523 SPEECH SOUND LANG COMPREHEN: CPT

## 2023-06-02 PROCEDURE — 92610 EVALUATE SWALLOWING FUNCTION: CPT

## 2023-06-02 PROCEDURE — 1280000000 HC REHAB R&B

## 2023-06-02 PROCEDURE — 97162 PT EVAL MOD COMPLEX 30 MIN: CPT

## 2023-06-02 PROCEDURE — 6360000002 HC RX W HCPCS: Performed by: STUDENT IN AN ORGANIZED HEALTH CARE EDUCATION/TRAINING PROGRAM

## 2023-06-02 PROCEDURE — 97530 THERAPEUTIC ACTIVITIES: CPT

## 2023-06-02 PROCEDURE — 6370000000 HC RX 637 (ALT 250 FOR IP): Performed by: STUDENT IN AN ORGANIZED HEALTH CARE EDUCATION/TRAINING PROGRAM

## 2023-06-02 PROCEDURE — 85025 COMPLETE CBC W/AUTO DIFF WBC: CPT

## 2023-06-02 PROCEDURE — 97535 SELF CARE MNGMENT TRAINING: CPT

## 2023-06-02 PROCEDURE — 97167 OT EVAL HIGH COMPLEX 60 MIN: CPT

## 2023-06-02 RX ORDER — MECOBALAMIN 5000 MCG
5 TABLET,DISINTEGRATING ORAL NIGHTLY PRN
Status: DISPENSED | OUTPATIENT
Start: 2023-06-02

## 2023-06-02 RX ORDER — POTASSIUM CHLORIDE 20 MEQ/1
40 TABLET, EXTENDED RELEASE ORAL DAILY
Status: DISCONTINUED | OUTPATIENT
Start: 2023-06-02 | End: 2023-06-05

## 2023-06-02 RX ORDER — ATORVASTATIN CALCIUM 40 MG/1
40 TABLET, FILM COATED ORAL DAILY
Status: DISPENSED | OUTPATIENT
Start: 2023-06-02

## 2023-06-02 RX ADMIN — ATORVASTATIN CALCIUM 40 MG: 80 TABLET, FILM COATED ORAL at 15:07

## 2023-06-02 RX ADMIN — Medication 5 MG: at 20:01

## 2023-06-02 RX ADMIN — ENOXAPARIN SODIUM 40 MG: 100 INJECTION SUBCUTANEOUS at 08:10

## 2023-06-02 RX ADMIN — Medication 400 MG: at 08:10

## 2023-06-02 RX ADMIN — AMOXICILLIN AND CLAVULANATE POTASSIUM 1 TABLET: 875; 125 TABLET, FILM COATED ORAL at 08:10

## 2023-06-02 RX ADMIN — POTASSIUM CHLORIDE 40 MEQ: 1500 TABLET, EXTENDED RELEASE ORAL at 10:44

## 2023-06-02 RX ADMIN — PANTOPRAZOLE SODIUM 40 MG: 40 TABLET, DELAYED RELEASE ORAL at 05:44

## 2023-06-02 RX ADMIN — SERTRALINE 100 MG: 50 TABLET, FILM COATED ORAL at 08:10

## 2023-06-02 RX ADMIN — AMOXICILLIN AND CLAVULANATE POTASSIUM 1 TABLET: 875; 125 TABLET, FILM COATED ORAL at 20:01

## 2023-06-02 RX ADMIN — OXYCODONE HYDROCHLORIDE 5 MG: 5 TABLET ORAL at 08:13

## 2023-06-02 ASSESSMENT — PAIN SCALES - GENERAL
PAINLEVEL_OUTOF10: 5
PAINLEVEL_OUTOF10: 0

## 2023-06-03 LAB
ANION GAP SERPL CALCULATED.3IONS-SCNC: 15 MMOL/L (ref 3–16)
BUN SERPL-MCNC: 13 MG/DL (ref 7–20)
CALCIUM SERPL-MCNC: 8.5 MG/DL (ref 8.3–10.6)
CHLORIDE SERPL-SCNC: 103 MMOL/L (ref 99–110)
CO2 SERPL-SCNC: 20 MMOL/L (ref 21–32)
CREAT SERPL-MCNC: 0.8 MG/DL (ref 0.8–1.3)
GFR SERPLBLD CREATININE-BSD FMLA CKD-EPI: >60 ML/MIN/{1.73_M2}
GLUCOSE SERPL-MCNC: 91 MG/DL (ref 70–99)
MAGNESIUM SERPL-MCNC: 2 MG/DL (ref 1.8–2.4)
POTASSIUM SERPL-SCNC: 3.6 MMOL/L (ref 3.5–5.1)
SODIUM SERPL-SCNC: 138 MMOL/L (ref 136–145)

## 2023-06-03 PROCEDURE — 6370000000 HC RX 637 (ALT 250 FOR IP): Performed by: STUDENT IN AN ORGANIZED HEALTH CARE EDUCATION/TRAINING PROGRAM

## 2023-06-03 PROCEDURE — 97130 THER IVNTJ EA ADDL 15 MIN: CPT

## 2023-06-03 PROCEDURE — 1280000000 HC REHAB R&B

## 2023-06-03 PROCEDURE — 97110 THERAPEUTIC EXERCISES: CPT

## 2023-06-03 PROCEDURE — 97129 THER IVNTJ 1ST 15 MIN: CPT

## 2023-06-03 PROCEDURE — 83735 ASSAY OF MAGNESIUM: CPT

## 2023-06-03 PROCEDURE — 94640 AIRWAY INHALATION TREATMENT: CPT

## 2023-06-03 PROCEDURE — 97535 SELF CARE MNGMENT TRAINING: CPT

## 2023-06-03 PROCEDURE — 97116 GAIT TRAINING THERAPY: CPT

## 2023-06-03 PROCEDURE — 97530 THERAPEUTIC ACTIVITIES: CPT

## 2023-06-03 PROCEDURE — 36415 COLL VENOUS BLD VENIPUNCTURE: CPT

## 2023-06-03 PROCEDURE — 6360000002 HC RX W HCPCS: Performed by: STUDENT IN AN ORGANIZED HEALTH CARE EDUCATION/TRAINING PROGRAM

## 2023-06-03 PROCEDURE — 80048 BASIC METABOLIC PNL TOTAL CA: CPT

## 2023-06-03 RX ADMIN — ENOXAPARIN SODIUM 40 MG: 100 INJECTION SUBCUTANEOUS at 08:55

## 2023-06-03 RX ADMIN — Medication 400 MG: at 08:54

## 2023-06-03 RX ADMIN — ATORVASTATIN CALCIUM 40 MG: 80 TABLET, FILM COATED ORAL at 08:55

## 2023-06-03 RX ADMIN — AMOXICILLIN AND CLAVULANATE POTASSIUM 1 TABLET: 875; 125 TABLET, FILM COATED ORAL at 20:32

## 2023-06-03 RX ADMIN — TIOTROPIUM BROMIDE INHALATION SPRAY 2 PUFF: 3.12 SPRAY, METERED RESPIRATORY (INHALATION) at 08:30

## 2023-06-03 RX ADMIN — OXYCODONE HYDROCHLORIDE 5 MG: 5 TABLET ORAL at 11:03

## 2023-06-03 RX ADMIN — OXYCODONE HYDROCHLORIDE 5 MG: 5 TABLET ORAL at 20:32

## 2023-06-03 RX ADMIN — SERTRALINE 100 MG: 50 TABLET, FILM COATED ORAL at 08:55

## 2023-06-03 RX ADMIN — OXYCODONE HYDROCHLORIDE 5 MG: 5 TABLET ORAL at 06:53

## 2023-06-03 RX ADMIN — AMOXICILLIN AND CLAVULANATE POTASSIUM 1 TABLET: 875; 125 TABLET, FILM COATED ORAL at 08:54

## 2023-06-03 RX ADMIN — PANTOPRAZOLE SODIUM 40 MG: 40 TABLET, DELAYED RELEASE ORAL at 06:12

## 2023-06-03 RX ADMIN — POTASSIUM CHLORIDE 40 MEQ: 1500 TABLET, EXTENDED RELEASE ORAL at 08:55

## 2023-06-03 ASSESSMENT — PAIN - FUNCTIONAL ASSESSMENT: PAIN_FUNCTIONAL_ASSESSMENT: ACTIVITIES ARE NOT PREVENTED

## 2023-06-03 ASSESSMENT — PAIN DESCRIPTION - ORIENTATION: ORIENTATION: LEFT

## 2023-06-03 ASSESSMENT — PAIN DESCRIPTION - ONSET: ONSET: GRADUAL

## 2023-06-03 ASSESSMENT — PAIN DESCRIPTION - LOCATION: LOCATION: CHEST

## 2023-06-03 ASSESSMENT — PAIN SCALES - GENERAL
PAINLEVEL_OUTOF10: 6
PAINLEVEL_OUTOF10: 0
PAINLEVEL_OUTOF10: 8
PAINLEVEL_OUTOF10: 8
PAINLEVEL_OUTOF10: 0

## 2023-06-03 ASSESSMENT — PAIN DESCRIPTION - FREQUENCY: FREQUENCY: INTERMITTENT

## 2023-06-03 ASSESSMENT — PAIN DESCRIPTION - PAIN TYPE: TYPE: ACUTE PAIN

## 2023-06-03 ASSESSMENT — PAIN DESCRIPTION - DESCRIPTORS: DESCRIPTORS: SHARP

## 2023-06-04 PROCEDURE — 94640 AIRWAY INHALATION TREATMENT: CPT

## 2023-06-04 PROCEDURE — 1280000000 HC REHAB R&B

## 2023-06-04 PROCEDURE — 6370000000 HC RX 637 (ALT 250 FOR IP): Performed by: STUDENT IN AN ORGANIZED HEALTH CARE EDUCATION/TRAINING PROGRAM

## 2023-06-04 PROCEDURE — 6360000002 HC RX W HCPCS: Performed by: STUDENT IN AN ORGANIZED HEALTH CARE EDUCATION/TRAINING PROGRAM

## 2023-06-04 RX ADMIN — AMOXICILLIN AND CLAVULANATE POTASSIUM 1 TABLET: 875; 125 TABLET, FILM COATED ORAL at 12:04

## 2023-06-04 RX ADMIN — TIOTROPIUM BROMIDE INHALATION SPRAY 2 PUFF: 3.12 SPRAY, METERED RESPIRATORY (INHALATION) at 08:06

## 2023-06-04 RX ADMIN — OXYCODONE HYDROCHLORIDE 5 MG: 5 TABLET ORAL at 22:19

## 2023-06-04 RX ADMIN — ENOXAPARIN SODIUM 40 MG: 100 INJECTION SUBCUTANEOUS at 12:04

## 2023-06-04 RX ADMIN — SERTRALINE HYDROCHLORIDE 50 MG: 50 TABLET ORAL at 12:04

## 2023-06-04 RX ADMIN — Medication 400 MG: at 12:04

## 2023-06-04 RX ADMIN — AMOXICILLIN AND CLAVULANATE POTASSIUM 1 TABLET: 875; 125 TABLET, FILM COATED ORAL at 22:19

## 2023-06-04 RX ADMIN — ATORVASTATIN CALCIUM 40 MG: 80 TABLET, FILM COATED ORAL at 12:04

## 2023-06-04 RX ADMIN — POTASSIUM CHLORIDE 40 MEQ: 1500 TABLET, EXTENDED RELEASE ORAL at 12:04

## 2023-06-04 RX ADMIN — OXYCODONE HYDROCHLORIDE 5 MG: 5 TABLET ORAL at 11:51

## 2023-06-04 RX ADMIN — PANTOPRAZOLE SODIUM 40 MG: 40 TABLET, DELAYED RELEASE ORAL at 05:49

## 2023-06-04 ASSESSMENT — PAIN DESCRIPTION - ORIENTATION: ORIENTATION: LEFT

## 2023-06-04 ASSESSMENT — PAIN DESCRIPTION - FREQUENCY: FREQUENCY: INTERMITTENT

## 2023-06-04 ASSESSMENT — PAIN DESCRIPTION - DESCRIPTORS: DESCRIPTORS: BURNING

## 2023-06-04 ASSESSMENT — PAIN SCALES - GENERAL
PAINLEVEL_OUTOF10: 8
PAINLEVEL_OUTOF10: 8

## 2023-06-04 ASSESSMENT — PAIN - FUNCTIONAL ASSESSMENT: PAIN_FUNCTIONAL_ASSESSMENT: PREVENTS OR INTERFERES SOME ACTIVE ACTIVITIES AND ADLS

## 2023-06-04 ASSESSMENT — PAIN DESCRIPTION - PAIN TYPE: TYPE: ACUTE PAIN

## 2023-06-04 ASSESSMENT — PAIN DESCRIPTION - LOCATION: LOCATION: BACK

## 2023-06-05 LAB
ANION GAP SERPL CALCULATED.3IONS-SCNC: 14 MMOL/L (ref 3–16)
ANISOCYTOSIS BLD QL SMEAR: ABNORMAL
BASOPHILS # BLD: 0 K/UL (ref 0–0.2)
BASOPHILS NFR BLD: 0 %
BUN SERPL-MCNC: 8 MG/DL (ref 7–20)
CALCIUM SERPL-MCNC: 8.3 MG/DL (ref 8.3–10.6)
CHLORIDE SERPL-SCNC: 101 MMOL/L (ref 99–110)
CO2 SERPL-SCNC: 20 MMOL/L (ref 21–32)
CREAT SERPL-MCNC: 0.8 MG/DL (ref 0.8–1.3)
DEPRECATED RDW RBC AUTO: 16.7 % (ref 12.4–15.4)
EOSINOPHIL # BLD: 0.6 K/UL (ref 0–0.6)
EOSINOPHIL NFR BLD: 7 %
GFR SERPLBLD CREATININE-BSD FMLA CKD-EPI: >60 ML/MIN/{1.73_M2}
GLUCOSE SERPL-MCNC: 82 MG/DL (ref 70–99)
HCT VFR BLD AUTO: 24.7 % (ref 40.5–52.5)
HGB BLD-MCNC: 8.4 G/DL (ref 13.5–17.5)
LYMPHOCYTES # BLD: 1.4 K/UL (ref 1–5.1)
LYMPHOCYTES NFR BLD: 17 %
MCH RBC QN AUTO: 34.1 PG (ref 26–34)
MCHC RBC AUTO-ENTMCNC: 34 G/DL (ref 31–36)
MCV RBC AUTO: 100.4 FL (ref 80–100)
MONOCYTES # BLD: 1.2 K/UL (ref 0–1.3)
MONOCYTES NFR BLD: 14 %
NEUTROPHILS # BLD: 5.2 K/UL (ref 1.7–7.7)
NEUTROPHILS NFR BLD: 54 %
NEUTS BAND NFR BLD MANUAL: 8 % (ref 0–7)
PLATELET # BLD AUTO: 373 K/UL (ref 135–450)
PLATELET BLD QL SMEAR: ADEQUATE
PMV BLD AUTO: 7.1 FL (ref 5–10.5)
POLYCHROMASIA BLD QL SMEAR: ABNORMAL
POTASSIUM SERPL-SCNC: 4.1 MMOL/L (ref 3.5–5.1)
RBC # BLD AUTO: 2.46 M/UL (ref 4.2–5.9)
SLIDE REVIEW: ABNORMAL
SODIUM SERPL-SCNC: 135 MMOL/L (ref 136–145)
WBC # BLD AUTO: 8.4 K/UL (ref 4–11)

## 2023-06-05 PROCEDURE — 97530 THERAPEUTIC ACTIVITIES: CPT

## 2023-06-05 PROCEDURE — 97116 GAIT TRAINING THERAPY: CPT

## 2023-06-05 PROCEDURE — 97110 THERAPEUTIC EXERCISES: CPT

## 2023-06-05 PROCEDURE — 1280000000 HC REHAB R&B

## 2023-06-05 PROCEDURE — 97130 THER IVNTJ EA ADDL 15 MIN: CPT

## 2023-06-05 PROCEDURE — 85025 COMPLETE CBC W/AUTO DIFF WBC: CPT

## 2023-06-05 PROCEDURE — 80048 BASIC METABOLIC PNL TOTAL CA: CPT

## 2023-06-05 PROCEDURE — 97129 THER IVNTJ 1ST 15 MIN: CPT

## 2023-06-05 PROCEDURE — 36415 COLL VENOUS BLD VENIPUNCTURE: CPT

## 2023-06-05 PROCEDURE — 94640 AIRWAY INHALATION TREATMENT: CPT

## 2023-06-05 PROCEDURE — 6360000002 HC RX W HCPCS: Performed by: STUDENT IN AN ORGANIZED HEALTH CARE EDUCATION/TRAINING PROGRAM

## 2023-06-05 PROCEDURE — 6370000000 HC RX 637 (ALT 250 FOR IP): Performed by: STUDENT IN AN ORGANIZED HEALTH CARE EDUCATION/TRAINING PROGRAM

## 2023-06-05 PROCEDURE — 92526 ORAL FUNCTION THERAPY: CPT

## 2023-06-05 RX ORDER — POTASSIUM CHLORIDE 20 MEQ/1
20 TABLET, EXTENDED RELEASE ORAL DAILY
Status: DISCONTINUED | OUTPATIENT
Start: 2023-06-06 | End: 2023-06-09

## 2023-06-05 RX ADMIN — POTASSIUM CHLORIDE 40 MEQ: 1500 TABLET, EXTENDED RELEASE ORAL at 08:20

## 2023-06-05 RX ADMIN — TIOTROPIUM BROMIDE INHALATION SPRAY 2 PUFF: 3.12 SPRAY, METERED RESPIRATORY (INHALATION) at 07:18

## 2023-06-05 RX ADMIN — Medication 400 MG: at 08:20

## 2023-06-05 RX ADMIN — PANTOPRAZOLE SODIUM 40 MG: 40 TABLET, DELAYED RELEASE ORAL at 06:21

## 2023-06-05 RX ADMIN — ATORVASTATIN CALCIUM 40 MG: 80 TABLET, FILM COATED ORAL at 08:20

## 2023-06-05 RX ADMIN — AMOXICILLIN AND CLAVULANATE POTASSIUM 1 TABLET: 875; 125 TABLET, FILM COATED ORAL at 08:20

## 2023-06-05 RX ADMIN — OXYCODONE HYDROCHLORIDE 5 MG: 5 TABLET ORAL at 21:56

## 2023-06-05 RX ADMIN — ENOXAPARIN SODIUM 40 MG: 100 INJECTION SUBCUTANEOUS at 08:20

## 2023-06-05 RX ADMIN — SERTRALINE HYDROCHLORIDE 50 MG: 50 TABLET ORAL at 08:20

## 2023-06-05 RX ADMIN — AMOXICILLIN AND CLAVULANATE POTASSIUM 1 TABLET: 875; 125 TABLET, FILM COATED ORAL at 21:56

## 2023-06-05 ASSESSMENT — PAIN SCALES - GENERAL
PAINLEVEL_OUTOF10: 7
PAINLEVEL_OUTOF10: 0

## 2023-06-05 ASSESSMENT — PAIN DESCRIPTION - PAIN TYPE: TYPE: ACUTE PAIN;SURGICAL PAIN

## 2023-06-05 ASSESSMENT — PAIN DESCRIPTION - ORIENTATION: ORIENTATION: LEFT;RIGHT

## 2023-06-05 ASSESSMENT — PAIN DESCRIPTION - LOCATION: LOCATION: BACK;FOOT

## 2023-06-06 PROCEDURE — 97110 THERAPEUTIC EXERCISES: CPT

## 2023-06-06 PROCEDURE — 97130 THER IVNTJ EA ADDL 15 MIN: CPT

## 2023-06-06 PROCEDURE — 97129 THER IVNTJ 1ST 15 MIN: CPT

## 2023-06-06 PROCEDURE — 92526 ORAL FUNCTION THERAPY: CPT

## 2023-06-06 PROCEDURE — 6370000000 HC RX 637 (ALT 250 FOR IP): Performed by: STUDENT IN AN ORGANIZED HEALTH CARE EDUCATION/TRAINING PROGRAM

## 2023-06-06 PROCEDURE — 97535 SELF CARE MNGMENT TRAINING: CPT

## 2023-06-06 PROCEDURE — 94640 AIRWAY INHALATION TREATMENT: CPT

## 2023-06-06 PROCEDURE — 97116 GAIT TRAINING THERAPY: CPT

## 2023-06-06 PROCEDURE — 1280000000 HC REHAB R&B

## 2023-06-06 PROCEDURE — 6360000002 HC RX W HCPCS: Performed by: STUDENT IN AN ORGANIZED HEALTH CARE EDUCATION/TRAINING PROGRAM

## 2023-06-06 PROCEDURE — 97530 THERAPEUTIC ACTIVITIES: CPT

## 2023-06-06 RX ADMIN — PANTOPRAZOLE SODIUM 40 MG: 40 TABLET, DELAYED RELEASE ORAL at 06:05

## 2023-06-06 RX ADMIN — Medication 400 MG: at 08:56

## 2023-06-06 RX ADMIN — ENOXAPARIN SODIUM 40 MG: 100 INJECTION SUBCUTANEOUS at 08:55

## 2023-06-06 RX ADMIN — AMOXICILLIN AND CLAVULANATE POTASSIUM 1 TABLET: 875; 125 TABLET, FILM COATED ORAL at 20:49

## 2023-06-06 RX ADMIN — POTASSIUM CHLORIDE 20 MEQ: 1500 TABLET, EXTENDED RELEASE ORAL at 08:56

## 2023-06-06 RX ADMIN — ATORVASTATIN CALCIUM 40 MG: 80 TABLET, FILM COATED ORAL at 08:56

## 2023-06-06 RX ADMIN — SERTRALINE HYDROCHLORIDE 50 MG: 50 TABLET ORAL at 08:56

## 2023-06-06 RX ADMIN — TIOTROPIUM BROMIDE INHALATION SPRAY 2 PUFF: 3.12 SPRAY, METERED RESPIRATORY (INHALATION) at 07:25

## 2023-06-06 RX ADMIN — AMOXICILLIN AND CLAVULANATE POTASSIUM 1 TABLET: 875; 125 TABLET, FILM COATED ORAL at 08:56

## 2023-06-06 RX ADMIN — OXYCODONE HYDROCHLORIDE 5 MG: 5 TABLET ORAL at 20:49

## 2023-06-06 ASSESSMENT — PAIN SCALES - GENERAL
PAINLEVEL_OUTOF10: 0
PAINLEVEL_OUTOF10: 7

## 2023-06-06 ASSESSMENT — PAIN DESCRIPTION - ORIENTATION: ORIENTATION: LEFT

## 2023-06-06 ASSESSMENT — PAIN DESCRIPTION - LOCATION: LOCATION: BACK

## 2023-06-06 NOTE — PATIENT CARE CONFERENCE
CCC-SLP   Nurse: Omari Ochoa RN  Dietician: Rimma Bae RDN, LD  : Edilma Sherman, OTR/L  Psychiatry: N/A    Family members present at conference: No      I led this team conference and I approve the established interdisciplinary plan of care as documented within the medical record of Aakash Marshall.     MD: Electronically signed by Jaci Espinoza MD on 6/7/2023 at 1:24 PM

## 2023-06-06 NOTE — PLAN OF CARE
Problem: Safety - Adult  Goal: Free from fall injury  6/6/2023 0957 by Tan Jose RN  Outcome: Progressing  6/5/2023 2327 by Anneliese Palacios RN  Outcome: Progressing     Problem: Skin/Tissue Integrity - Adult  Goal: Skin integrity remains intact  Outcome: Progressing

## 2023-06-07 LAB
ANION GAP SERPL CALCULATED.3IONS-SCNC: 11 MMOL/L (ref 3–16)
BUN SERPL-MCNC: 5 MG/DL (ref 7–20)
CALCIUM SERPL-MCNC: 8.2 MG/DL (ref 8.3–10.6)
CHLORIDE SERPL-SCNC: 99 MMOL/L (ref 99–110)
CO2 SERPL-SCNC: 22 MMOL/L (ref 21–32)
CREAT SERPL-MCNC: 0.9 MG/DL (ref 0.8–1.3)
GFR SERPLBLD CREATININE-BSD FMLA CKD-EPI: >60 ML/MIN/{1.73_M2}
GLUCOSE SERPL-MCNC: 113 MG/DL (ref 70–99)
MAGNESIUM SERPL-MCNC: 2 MG/DL (ref 1.8–2.4)
POTASSIUM SERPL-SCNC: 3.5 MMOL/L (ref 3.5–5.1)
SODIUM SERPL-SCNC: 132 MMOL/L (ref 136–145)

## 2023-06-07 PROCEDURE — 80048 BASIC METABOLIC PNL TOTAL CA: CPT

## 2023-06-07 PROCEDURE — 97129 THER IVNTJ 1ST 15 MIN: CPT

## 2023-06-07 PROCEDURE — 97535 SELF CARE MNGMENT TRAINING: CPT

## 2023-06-07 PROCEDURE — 94640 AIRWAY INHALATION TREATMENT: CPT

## 2023-06-07 PROCEDURE — 6370000000 HC RX 637 (ALT 250 FOR IP): Performed by: STUDENT IN AN ORGANIZED HEALTH CARE EDUCATION/TRAINING PROGRAM

## 2023-06-07 PROCEDURE — 97130 THER IVNTJ EA ADDL 15 MIN: CPT

## 2023-06-07 PROCEDURE — 1280000000 HC REHAB R&B

## 2023-06-07 PROCEDURE — 92526 ORAL FUNCTION THERAPY: CPT

## 2023-06-07 PROCEDURE — 6360000002 HC RX W HCPCS: Performed by: STUDENT IN AN ORGANIZED HEALTH CARE EDUCATION/TRAINING PROGRAM

## 2023-06-07 PROCEDURE — 97116 GAIT TRAINING THERAPY: CPT

## 2023-06-07 PROCEDURE — 97530 THERAPEUTIC ACTIVITIES: CPT

## 2023-06-07 PROCEDURE — 83735 ASSAY OF MAGNESIUM: CPT

## 2023-06-07 PROCEDURE — 36415 COLL VENOUS BLD VENIPUNCTURE: CPT

## 2023-06-07 RX ADMIN — ENOXAPARIN SODIUM 40 MG: 100 INJECTION SUBCUTANEOUS at 10:09

## 2023-06-07 RX ADMIN — ATORVASTATIN CALCIUM 40 MG: 80 TABLET, FILM COATED ORAL at 10:09

## 2023-06-07 RX ADMIN — SERTRALINE HYDROCHLORIDE 50 MG: 50 TABLET ORAL at 10:09

## 2023-06-07 RX ADMIN — Medication 5 MG: at 21:15

## 2023-06-07 RX ADMIN — Medication 400 MG: at 10:09

## 2023-06-07 RX ADMIN — PANTOPRAZOLE SODIUM 40 MG: 40 TABLET, DELAYED RELEASE ORAL at 06:08

## 2023-06-07 RX ADMIN — OXYCODONE HYDROCHLORIDE 5 MG: 5 TABLET ORAL at 21:15

## 2023-06-07 RX ADMIN — TIOTROPIUM BROMIDE INHALATION SPRAY 2 PUFF: 3.12 SPRAY, METERED RESPIRATORY (INHALATION) at 07:45

## 2023-06-07 ASSESSMENT — PAIN SCALES - GENERAL: PAINLEVEL_OUTOF10: 7

## 2023-06-07 ASSESSMENT — PAIN DESCRIPTION - LOCATION: LOCATION: BACK;BUTTOCKS

## 2023-06-08 LAB
ANION GAP SERPL CALCULATED.3IONS-SCNC: 12 MMOL/L (ref 3–16)
BASOPHILS # BLD: 0 K/UL (ref 0–0.2)
BASOPHILS NFR BLD: 0.4 %
BUN SERPL-MCNC: 5 MG/DL (ref 7–20)
CALCIUM SERPL-MCNC: 8 MG/DL (ref 8.3–10.6)
CHLORIDE SERPL-SCNC: 99 MMOL/L (ref 99–110)
CO2 SERPL-SCNC: 22 MMOL/L (ref 21–32)
CREAT SERPL-MCNC: 0.9 MG/DL (ref 0.8–1.3)
DEPRECATED RDW RBC AUTO: 16.2 % (ref 12.4–15.4)
EOSINOPHIL # BLD: 0.3 K/UL (ref 0–0.6)
EOSINOPHIL NFR BLD: 4.7 %
GFR SERPLBLD CREATININE-BSD FMLA CKD-EPI: >60 ML/MIN/{1.73_M2}
GLUCOSE SERPL-MCNC: 97 MG/DL (ref 70–99)
HCT VFR BLD AUTO: 23.2 % (ref 40.5–52.5)
HGB BLD-MCNC: 8 G/DL (ref 13.5–17.5)
LYMPHOCYTES # BLD: 1.3 K/UL (ref 1–5.1)
LYMPHOCYTES NFR BLD: 20.5 %
MCH RBC QN AUTO: 33.9 PG (ref 26–34)
MCHC RBC AUTO-ENTMCNC: 34.4 G/DL (ref 31–36)
MCV RBC AUTO: 98.8 FL (ref 80–100)
MONOCYTES # BLD: 0.9 K/UL (ref 0–1.3)
MONOCYTES NFR BLD: 14.6 %
NEUTROPHILS # BLD: 3.9 K/UL (ref 1.7–7.7)
NEUTROPHILS NFR BLD: 59.8 %
PLATELET # BLD AUTO: 362 K/UL (ref 135–450)
PMV BLD AUTO: 6.9 FL (ref 5–10.5)
POTASSIUM SERPL-SCNC: 3.6 MMOL/L (ref 3.5–5.1)
RBC # BLD AUTO: 2.35 M/UL (ref 4.2–5.9)
SODIUM SERPL-SCNC: 133 MMOL/L (ref 136–145)
WBC # BLD AUTO: 6.5 K/UL (ref 4–11)

## 2023-06-08 PROCEDURE — 97116 GAIT TRAINING THERAPY: CPT

## 2023-06-08 PROCEDURE — 97110 THERAPEUTIC EXERCISES: CPT

## 2023-06-08 PROCEDURE — 36415 COLL VENOUS BLD VENIPUNCTURE: CPT

## 2023-06-08 PROCEDURE — 6360000002 HC RX W HCPCS: Performed by: STUDENT IN AN ORGANIZED HEALTH CARE EDUCATION/TRAINING PROGRAM

## 2023-06-08 PROCEDURE — 97530 THERAPEUTIC ACTIVITIES: CPT

## 2023-06-08 PROCEDURE — 6370000000 HC RX 637 (ALT 250 FOR IP): Performed by: STUDENT IN AN ORGANIZED HEALTH CARE EDUCATION/TRAINING PROGRAM

## 2023-06-08 PROCEDURE — 97130 THER IVNTJ EA ADDL 15 MIN: CPT

## 2023-06-08 PROCEDURE — 85025 COMPLETE CBC W/AUTO DIFF WBC: CPT

## 2023-06-08 PROCEDURE — 80048 BASIC METABOLIC PNL TOTAL CA: CPT

## 2023-06-08 PROCEDURE — 1280000000 HC REHAB R&B

## 2023-06-08 PROCEDURE — 94640 AIRWAY INHALATION TREATMENT: CPT

## 2023-06-08 PROCEDURE — 92526 ORAL FUNCTION THERAPY: CPT

## 2023-06-08 PROCEDURE — 97129 THER IVNTJ 1ST 15 MIN: CPT

## 2023-06-08 RX ADMIN — Medication 400 MG: at 07:46

## 2023-06-08 RX ADMIN — Medication 5 MG: at 21:27

## 2023-06-08 RX ADMIN — TIOTROPIUM BROMIDE INHALATION SPRAY 2 PUFF: 3.12 SPRAY, METERED RESPIRATORY (INHALATION) at 07:50

## 2023-06-08 RX ADMIN — OXYCODONE HYDROCHLORIDE 5 MG: 5 TABLET ORAL at 21:27

## 2023-06-08 RX ADMIN — ATORVASTATIN CALCIUM 40 MG: 80 TABLET, FILM COATED ORAL at 07:46

## 2023-06-08 RX ADMIN — ENOXAPARIN SODIUM 40 MG: 100 INJECTION SUBCUTANEOUS at 07:46

## 2023-06-08 RX ADMIN — PANTOPRAZOLE SODIUM 40 MG: 40 TABLET, DELAYED RELEASE ORAL at 05:54

## 2023-06-08 RX ADMIN — SERTRALINE HYDROCHLORIDE 50 MG: 50 TABLET ORAL at 07:47

## 2023-06-08 ASSESSMENT — PAIN SCALES - GENERAL
PAINLEVEL_OUTOF10: 7
PAINLEVEL_OUTOF10: 0
PAINLEVEL_OUTOF10: 0

## 2023-06-08 NOTE — PLAN OF CARE
Problem: Safety - Adult  Goal: Free from fall injury  6/8/2023 0048 by Gina Hernandez RN  Outcome: Progressing  6/7/2023 1844 by Gudelia Dodd, RN  Outcome: Progressing

## 2023-06-08 NOTE — PLAN OF CARE
Patient remains free from falls and injuries. Call light within reach. Patient has non skid footwear on and is compliant with asking staff to help with transfers. Patient's bed locked and in lowest position.  Tatyana House RN

## 2023-06-09 ENCOUNTER — APPOINTMENT (OUTPATIENT)
Dept: GENERAL RADIOLOGY | Age: 68
DRG: 949 | End: 2023-06-09
Attending: STUDENT IN AN ORGANIZED HEALTH CARE EDUCATION/TRAINING PROGRAM
Payer: MEDICARE

## 2023-06-09 LAB
ANION GAP SERPL CALCULATED.3IONS-SCNC: 14 MMOL/L (ref 3–16)
BILIRUB UR QL STRIP.AUTO: NEGATIVE
BUN SERPL-MCNC: 6 MG/DL (ref 7–20)
CALCIUM SERPL-MCNC: 7.8 MG/DL (ref 8.3–10.6)
CHLORIDE SERPL-SCNC: 91 MMOL/L (ref 99–110)
CLARITY UR: CLEAR
CO2 SERPL-SCNC: 21 MMOL/L (ref 21–32)
COLOR UR: ABNORMAL
CREAT SERPL-MCNC: 1.1 MG/DL (ref 0.8–1.3)
EKG ATRIAL RATE: 102 BPM
EKG DIAGNOSIS: NORMAL
EKG P AXIS: 84 DEGREES
EKG P-R INTERVAL: 136 MS
EKG Q-T INTERVAL: 430 MS
EKG QRS DURATION: 102 MS
EKG QTC CALCULATION (BAZETT): 560 MS
EKG R AXIS: 83 DEGREES
EKG T AXIS: 62 DEGREES
EKG VENTRICULAR RATE: 102 BPM
GFR SERPLBLD CREATININE-BSD FMLA CKD-EPI: >60 ML/MIN/{1.73_M2}
GLUCOSE SERPL-MCNC: 112 MG/DL (ref 70–99)
GLUCOSE UR STRIP.AUTO-MCNC: NEGATIVE MG/DL
HGB UR QL STRIP.AUTO: ABNORMAL
KETONES UR STRIP.AUTO-MCNC: NEGATIVE MG/DL
LEUKOCYTE ESTERASE UR QL STRIP.AUTO: NEGATIVE
MAGNESIUM SERPL-MCNC: 1.7 MG/DL (ref 1.8–2.4)
NITRITE UR QL STRIP.AUTO: NEGATIVE
PH UR STRIP.AUTO: 6 [PH] (ref 5–8)
POTASSIUM SERPL-SCNC: 3.4 MMOL/L (ref 3.5–5.1)
PROT UR STRIP.AUTO-MCNC: NEGATIVE MG/DL
RBC #/AREA URNS HPF: NORMAL /HPF (ref 0–4)
SODIUM SERPL-SCNC: 126 MMOL/L (ref 136–145)
SODIUM UR-SCNC: <20 MMOL/L
SP GR UR STRIP.AUTO: <=1.005 (ref 1–1.03)
UA COMPLETE W REFLEX CULTURE PNL UR: ABNORMAL
UA DIPSTICK W REFLEX MICRO PNL UR: YES
URATE SERPL-MCNC: 5.2 MG/DL (ref 3.5–7.2)
URN SPEC COLLECT METH UR: ABNORMAL
UROBILINOGEN UR STRIP-ACNC: 0.2 E.U./DL
WBC #/AREA URNS HPF: NORMAL /HPF (ref 0–5)

## 2023-06-09 PROCEDURE — 6360000002 HC RX W HCPCS: Performed by: STUDENT IN AN ORGANIZED HEALTH CARE EDUCATION/TRAINING PROGRAM

## 2023-06-09 PROCEDURE — 94640 AIRWAY INHALATION TREATMENT: CPT

## 2023-06-09 PROCEDURE — 81001 URINALYSIS AUTO W/SCOPE: CPT

## 2023-06-09 PROCEDURE — 83735 ASSAY OF MAGNESIUM: CPT

## 2023-06-09 PROCEDURE — 93010 ELECTROCARDIOGRAM REPORT: CPT | Performed by: INTERNAL MEDICINE

## 2023-06-09 PROCEDURE — 80048 BASIC METABOLIC PNL TOTAL CA: CPT

## 2023-06-09 PROCEDURE — 97110 THERAPEUTIC EXERCISES: CPT

## 2023-06-09 PROCEDURE — 84439 ASSAY OF FREE THYROXINE: CPT

## 2023-06-09 PROCEDURE — 71045 X-RAY EXAM CHEST 1 VIEW: CPT

## 2023-06-09 PROCEDURE — 97530 THERAPEUTIC ACTIVITIES: CPT

## 2023-06-09 PROCEDURE — 97130 THER IVNTJ EA ADDL 15 MIN: CPT

## 2023-06-09 PROCEDURE — 1280000000 HC REHAB R&B

## 2023-06-09 PROCEDURE — 84550 ASSAY OF BLOOD/URIC ACID: CPT

## 2023-06-09 PROCEDURE — 92526 ORAL FUNCTION THERAPY: CPT

## 2023-06-09 PROCEDURE — 84443 ASSAY THYROID STIM HORMONE: CPT

## 2023-06-09 PROCEDURE — 83930 ASSAY OF BLOOD OSMOLALITY: CPT

## 2023-06-09 PROCEDURE — 84300 ASSAY OF URINE SODIUM: CPT

## 2023-06-09 PROCEDURE — 97116 GAIT TRAINING THERAPY: CPT

## 2023-06-09 PROCEDURE — 36415 COLL VENOUS BLD VENIPUNCTURE: CPT

## 2023-06-09 PROCEDURE — 97129 THER IVNTJ 1ST 15 MIN: CPT

## 2023-06-09 PROCEDURE — 93005 ELECTROCARDIOGRAM TRACING: CPT | Performed by: STUDENT IN AN ORGANIZED HEALTH CARE EDUCATION/TRAINING PROGRAM

## 2023-06-09 PROCEDURE — 97535 SELF CARE MNGMENT TRAINING: CPT

## 2023-06-09 PROCEDURE — 6370000000 HC RX 637 (ALT 250 FOR IP): Performed by: INTERNAL MEDICINE

## 2023-06-09 PROCEDURE — 83935 ASSAY OF URINE OSMOLALITY: CPT

## 2023-06-09 PROCEDURE — 6370000000 HC RX 637 (ALT 250 FOR IP): Performed by: STUDENT IN AN ORGANIZED HEALTH CARE EDUCATION/TRAINING PROGRAM

## 2023-06-09 RX ADMIN — SERTRALINE HYDROCHLORIDE 50 MG: 50 TABLET ORAL at 10:07

## 2023-06-09 RX ADMIN — POTASSIUM BICARBONATE 40 MEQ: 782 TABLET, EFFERVESCENT ORAL at 21:04

## 2023-06-09 RX ADMIN — Medication 400 MG: at 10:08

## 2023-06-09 RX ADMIN — Medication 5 MG: at 21:04

## 2023-06-09 RX ADMIN — ATORVASTATIN CALCIUM 40 MG: 80 TABLET, FILM COATED ORAL at 10:08

## 2023-06-09 RX ADMIN — TIOTROPIUM BROMIDE INHALATION SPRAY 2 PUFF: 3.12 SPRAY, METERED RESPIRATORY (INHALATION) at 07:54

## 2023-06-09 RX ADMIN — PANTOPRAZOLE SODIUM 40 MG: 40 TABLET, DELAYED RELEASE ORAL at 06:21

## 2023-06-09 RX ADMIN — OXYCODONE HYDROCHLORIDE 5 MG: 5 TABLET ORAL at 21:04

## 2023-06-09 RX ADMIN — ENOXAPARIN SODIUM 40 MG: 100 INJECTION SUBCUTANEOUS at 10:08

## 2023-06-09 ASSESSMENT — PAIN DESCRIPTION - LOCATION: LOCATION: HEAD

## 2023-06-09 ASSESSMENT — PAIN DESCRIPTION - DESCRIPTORS: DESCRIPTORS: ACHING

## 2023-06-09 ASSESSMENT — PAIN SCALES - GENERAL
PAINLEVEL_OUTOF10: 6
PAINLEVEL_OUTOF10: 7

## 2023-06-09 NOTE — PLAN OF CARE
PLAN OF CARE    Received request for inpatient consult. Provider Ruchi Chu NP notified.     Kevin Leblanc MD  6/9/2023  1:45 PM

## 2023-06-10 LAB
ALBUMIN SERPL-MCNC: 2.6 G/DL (ref 3.4–5)
ANION GAP SERPL CALCULATED.3IONS-SCNC: 11 MMOL/L (ref 3–16)
BUN SERPL-MCNC: 7 MG/DL (ref 7–20)
CALCIUM SERPL-MCNC: 8.1 MG/DL (ref 8.3–10.6)
CHLORIDE SERPL-SCNC: 96 MMOL/L (ref 99–110)
CO2 SERPL-SCNC: 24 MMOL/L (ref 21–32)
CREAT SERPL-MCNC: 0.8 MG/DL (ref 0.8–1.3)
GFR SERPLBLD CREATININE-BSD FMLA CKD-EPI: >60 ML/MIN/{1.73_M2}
GLUCOSE SERPL-MCNC: 104 MG/DL (ref 70–99)
MAGNESIUM SERPL-MCNC: 2 MG/DL (ref 1.8–2.4)
OSMOLALITY SERPL: 271 MOSM/KG (ref 280–301)
OSMOLALITY UR: 88 MOSM/KG (ref 390–1070)
PHOSPHATE SERPL-MCNC: 3.3 MG/DL (ref 2.5–4.9)
POTASSIUM SERPL-SCNC: 3.8 MMOL/L (ref 3.5–5.1)
SODIUM SERPL-SCNC: 131 MMOL/L (ref 136–145)
T4 FREE SERPL-MCNC: 1.3 NG/DL (ref 0.9–1.8)
TSH SERPL DL<=0.005 MIU/L-ACNC: 4.26 UIU/ML (ref 0.27–4.2)

## 2023-06-10 PROCEDURE — 1280000000 HC REHAB R&B

## 2023-06-10 PROCEDURE — 94640 AIRWAY INHALATION TREATMENT: CPT

## 2023-06-10 PROCEDURE — 6360000002 HC RX W HCPCS: Performed by: STUDENT IN AN ORGANIZED HEALTH CARE EDUCATION/TRAINING PROGRAM

## 2023-06-10 PROCEDURE — 6370000000 HC RX 637 (ALT 250 FOR IP): Performed by: STUDENT IN AN ORGANIZED HEALTH CARE EDUCATION/TRAINING PROGRAM

## 2023-06-10 PROCEDURE — 36415 COLL VENOUS BLD VENIPUNCTURE: CPT

## 2023-06-10 PROCEDURE — 83735 ASSAY OF MAGNESIUM: CPT

## 2023-06-10 PROCEDURE — 2580000003 HC RX 258: Performed by: NURSE PRACTITIONER

## 2023-06-10 PROCEDURE — 80069 RENAL FUNCTION PANEL: CPT

## 2023-06-10 RX ORDER — SODIUM CHLORIDE 9 MG/ML
INJECTION, SOLUTION INTRAVENOUS CONTINUOUS
Status: DISCONTINUED | OUTPATIENT
Start: 2023-06-10 | End: 2023-06-10

## 2023-06-10 RX ADMIN — SODIUM CHLORIDE: 9 INJECTION, SOLUTION INTRAVENOUS at 11:17

## 2023-06-10 RX ADMIN — Medication 400 MG: at 10:15

## 2023-06-10 RX ADMIN — ATORVASTATIN CALCIUM 40 MG: 80 TABLET, FILM COATED ORAL at 10:15

## 2023-06-10 RX ADMIN — PANTOPRAZOLE SODIUM 40 MG: 40 TABLET, DELAYED RELEASE ORAL at 10:16

## 2023-06-10 RX ADMIN — OXYCODONE HYDROCHLORIDE 5 MG: 5 TABLET ORAL at 11:18

## 2023-06-10 RX ADMIN — Medication 5 MG: at 21:30

## 2023-06-10 RX ADMIN — ENOXAPARIN SODIUM 40 MG: 100 INJECTION SUBCUTANEOUS at 10:16

## 2023-06-10 RX ADMIN — TIOTROPIUM BROMIDE INHALATION SPRAY 2 PUFF: 3.12 SPRAY, METERED RESPIRATORY (INHALATION) at 08:07

## 2023-06-10 ASSESSMENT — PAIN SCALES - GENERAL
PAINLEVEL_OUTOF10: 6
PAINLEVEL_OUTOF10: 0
PAINLEVEL_OUTOF10: 2
PAINLEVEL_OUTOF10: 0
PAINLEVEL_OUTOF10: 0

## 2023-06-10 ASSESSMENT — PAIN DESCRIPTION - LOCATION: LOCATION: BACK

## 2023-06-10 ASSESSMENT — PAIN DESCRIPTION - DESCRIPTORS: DESCRIPTORS: ACHING

## 2023-06-10 ASSESSMENT — PAIN DESCRIPTION - ORIENTATION: ORIENTATION: LOWER

## 2023-06-10 NOTE — PLAN OF CARE
Patient remains free from falls and injuries. Call light within reach. Patient is up with staff and does not try to get out of bed without help.  Renu Renae RN

## 2023-06-11 VITALS
BODY MASS INDEX: 18.45 KG/M2 | SYSTOLIC BLOOD PRESSURE: 101 MMHG | HEIGHT: 71 IN | OXYGEN SATURATION: 97 % | HEART RATE: 94 BPM | WEIGHT: 131.8 LBS | DIASTOLIC BLOOD PRESSURE: 67 MMHG | TEMPERATURE: 97.9 F | RESPIRATION RATE: 16 BRPM

## 2023-06-11 LAB
ALBUMIN SERPL-MCNC: 2.5 G/DL (ref 3.4–5)
ANION GAP SERPL CALCULATED.3IONS-SCNC: 10 MMOL/L (ref 3–16)
BUN SERPL-MCNC: 5 MG/DL (ref 7–20)
CALCIUM SERPL-MCNC: 8 MG/DL (ref 8.3–10.6)
CHLORIDE SERPL-SCNC: 98 MMOL/L (ref 99–110)
CO2 SERPL-SCNC: 24 MMOL/L (ref 21–32)
CREAT SERPL-MCNC: 0.8 MG/DL (ref 0.8–1.3)
GFR SERPLBLD CREATININE-BSD FMLA CKD-EPI: >60 ML/MIN/{1.73_M2}
GLUCOSE SERPL-MCNC: 101 MG/DL (ref 70–99)
MAGNESIUM SERPL-MCNC: 1.9 MG/DL (ref 1.8–2.4)
PHOSPHATE SERPL-MCNC: 3.2 MG/DL (ref 2.5–4.9)
POTASSIUM SERPL-SCNC: 3.7 MMOL/L (ref 3.5–5.1)
SODIUM SERPL-SCNC: 132 MMOL/L (ref 136–145)

## 2023-06-11 PROCEDURE — 1280000000 HC REHAB R&B

## 2023-06-11 PROCEDURE — 6370000000 HC RX 637 (ALT 250 FOR IP): Performed by: STUDENT IN AN ORGANIZED HEALTH CARE EDUCATION/TRAINING PROGRAM

## 2023-06-11 PROCEDURE — 94640 AIRWAY INHALATION TREATMENT: CPT

## 2023-06-11 PROCEDURE — 6360000002 HC RX W HCPCS: Performed by: STUDENT IN AN ORGANIZED HEALTH CARE EDUCATION/TRAINING PROGRAM

## 2023-06-11 RX ADMIN — TIOTROPIUM BROMIDE INHALATION SPRAY 2 PUFF: 3.12 SPRAY, METERED RESPIRATORY (INHALATION) at 07:25

## 2023-06-11 RX ADMIN — Medication 5 MG: at 20:14

## 2023-06-11 RX ADMIN — OXYCODONE HYDROCHLORIDE 5 MG: 5 TABLET ORAL at 16:20

## 2023-06-11 RX ADMIN — Medication 400 MG: at 09:18

## 2023-06-11 RX ADMIN — ENOXAPARIN SODIUM 40 MG: 100 INJECTION SUBCUTANEOUS at 09:18

## 2023-06-11 RX ADMIN — OXYCODONE HYDROCHLORIDE 5 MG: 5 TABLET ORAL at 20:14

## 2023-06-11 RX ADMIN — ATORVASTATIN CALCIUM 40 MG: 80 TABLET, FILM COATED ORAL at 09:18

## 2023-06-11 RX ADMIN — PANTOPRAZOLE SODIUM 40 MG: 40 TABLET, DELAYED RELEASE ORAL at 06:30

## 2023-06-11 ASSESSMENT — PAIN SCALES - GENERAL
PAINLEVEL_OUTOF10: 7
PAINLEVEL_OUTOF10: 0
PAINLEVEL_OUTOF10: 0
PAINLEVEL_OUTOF10: 7
PAINLEVEL_OUTOF10: 0
PAINLEVEL_OUTOF10: 7
PAINLEVEL_OUTOF10: 0

## 2023-06-11 ASSESSMENT — PAIN DESCRIPTION - LOCATION: LOCATION: BACK

## 2023-06-12 ASSESSMENT — PAIN DESCRIPTION - ORIENTATION: ORIENTATION: LEFT;UPPER

## 2023-06-12 ASSESSMENT — PAIN - FUNCTIONAL ASSESSMENT: PAIN_FUNCTIONAL_ASSESSMENT: ACTIVITIES ARE NOT PREVENTED

## 2023-06-12 ASSESSMENT — PAIN SCALES - GENERAL
PAINLEVEL_OUTOF10: 5
PAINLEVEL_OUTOF10: 5

## 2023-06-12 ASSESSMENT — PAIN DESCRIPTION - LOCATION
LOCATION: BACK
LOCATION: BACK

## 2023-06-12 ASSESSMENT — PAIN DESCRIPTION - DESCRIPTORS: DESCRIPTORS: ACHING

## 2023-06-12 NOTE — PLAN OF CARE
Problem: Safety - Adult  Goal: Free from fall injury  6/11/2023 2013 by Nishi Carias RN  Outcome: Progressing  6/11/2023 1435 by Elina Jones RN  Outcome: Progressing

## 2023-06-12 NOTE — PLAN OF CARE
Problem: Discharge Planning  Goal: Discharge to home or other facility with appropriate resources  6/12/2023 1728 by John Clemente RN  Outcome: Progressing

## 2023-06-12 NOTE — PROCEDURES
Pharyngeal Wall - Weakness: All  Fatigue of Mechanism: All  Pharyngeal phase comment: Pharyngeal phase characterized by deficits above         Esophageal Phase  Esophageal phase comment: esophageal phase of the swallow grossly functional from lateral view of cervical esophagus to oropharynx     Aspiration Scale         X 1 Material does not enter the airway    2 Material enters the airway, remains above the vocal folds, and is ejected from the airway    3 Material enters the airway, remains above the vocal folds, and is not ejected from the airway    4 Material enters the airway, contacts the vocal folds, an is ejected from the airway    5 Material enters the airway, contacts the vocal folds, and is not ejected from the airway    6 Material enters the airway, passes below the vocal folds and is ejected into the larynx or out of the airway    7 Material enters the airway, passes below the vocal folds, and is not ejected from the trachea despite effort    8 Material enters the airway, passes below the vocal folds, and no effort is made to eject. Compensatory Swallowing Strategies Attempted:small bites/sips, slow rate, alternate liquids and solids, double swallow   Postural Changes and/or Swallow Maneuvers Trialed: fully upright for all PO and 30 min after PO  Patient Position: Lateral and Patient Degrees: 90 degrees, Seated upright in [facility] chair  Consistencies Administered: thin, puree, minced and moist, soft and bite sized, regular       Recommendations:  Diet recommendation: IDDSI 6 Soft and Bite Sized Solids; IDDSI 0 Thin Liquids;  Meds whole with thin liquids - 1 at a time   Risk management: upright for all intake, stay upright for at least 30 mins after intake, small bites/sips, 1:1 supervision with intake, oral care 2-3x/day to reduce adverse affects in the event of aspiration, increase physical mobility as able, alternate bites/sips, slow rate of intake, general GERD precautions, and general

## 2023-06-12 NOTE — PLAN OF CARE
Problem: Safety - Adult  Goal: Free from fall injury  Outcome: Progressing     Problem: Pain  Goal: Verbalizes/displays adequate comfort level or baseline comfort level  Outcome: Progressing     Problem: Musculoskeletal - Adult  Goal: Return mobility to safest level of function  Outcome: Progressing

## 2023-06-13 NOTE — CARE COORDINATION
Case Management Discharge Summary  [facility] - Acute Rehab Unit    Patient:Cedric Boss     Rehab Dx/Hx: S/P lobectomy of lung [Z90.2]       Today's Date: 6/13/2023    Discharge to:  Home with spouse and home care   Pre-certification completed:  [x] No       [] Yes     [] [address] Exemption Notification (HENS) completed:  [x] No       [] Yes     [] N/A   IMM given:  06/13/23       New Durable Medical Equipment ordered/agency:  [facility]: Rolling walker   Transportation:  Private          Confirmed discharge plan with:  Patient: [] No       [x] Yes  Family:  [] No       [x] Yes      Name:Jodi Arreaga (Spouse)   Contact number: [phone]   Facility/Agency, name:  [facility] [address]   [address] [facility] [address]   [phone]  VICTOR MANUEL/AVS faxed  RN, name: Lonnie Chambers RN       Has lack of transportation kept you from medical appointments, meetings, work, or ADL's? [] Yes, it has kept me from medical appointments or from getting my meds  [] Yes, It has kept me from non-medical meetings, appointments, work, or getting things I need  [x] No  [] Pt unable to respond  [] Pt declines to respond     How often do you need to have someone help you when you read instructions, pamphlets, or other written material from your doctor or pharmacy? [] Never                             [] Always  [] Rarely                            [] Patient declines to respond  [x] Sometimes                    [] Patient unable to respond  [] Often       Note: Discharging nurse to complete VICTOR MANUEL, reconcile AVS, and place final copy with patient's discharge packet. RN to ensure that written prescriptions for  Level II medications are sent with patient to the facility as per protocol.           Signature:  Alberta Hartman RN

## 2023-06-13 NOTE — CONSULTS
Internal medicine consult notified to Dr Marcella Arguello via perfect serve by Bonnie Vivas RN
PALLIATIVE MEDICINE CONSULTATION     Patient name:Cedric Rahman   FWR:[id number]    :1955  Room/Bed:0170/0170-01   LOS: 12 days         Date of consult:2023    Consult Information  Palliative Medicine Consult performed by: JOEL Lucio CNP, CNP    Inpatient consult to Palliative Care  Consult performed by: JOEL Sheehan CNP  Consult ordered by: Hanna Kelsey MD             ASSESSMENT/RECOMMENDATIONS     74 y.o. male with lung cancer s/p KYLE lobectomy      Symptom Management:  Goals of Care- remain aggressive with medical care. Return hospitalizations as needed. Full code status. Squamous Cell Carcinoma -  S/P lobectomy of KYLE on  with Dr. Yamel Lopes - due to malignancy, deconditioning, malnutrition, dysphagia. Completed ARU admission. Returning home with significant family presence and home PT. Declines any other home assistance. No recent falls. Long discussion with patient and wife about importance of fall prevention. Nutritional status is poor; albumin 2.5, total protein (not collected). BMI 18. Patient is not agreeable to pureed diet, but reports able to eat if wife cuts up food into tiny bites. Wife states when she was able to feed patient in a way he accepted he put on 35 lbs. Patient/Family Goals of Care :    23    Spoke with patient at the bedside. Patient is alert and oriented and decisional.  Patient agreeable to discussion with me today. Wife is at bedside. Patient is dressed and ready to leave. Wife is anxious for discharge and verbalizes frustration with his medical stay and care received. Wife states patient has not been eating because staff has not been feeding him properly. He hates a pureed diet. At home she cuts his food in tiny pieces and feeds him very slowly. She was able to get him to put on 35 lbs recently (before this recent admission).   Other family at the bedside state patient has always
all will lead to SIADH. 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 Please do not hesitate to call with questions. Vivek Pereyra MD  Office: [phone]  Fax:    [phone] [address] [facility]
change and bronchiectasis is seen in the left upper lobe. Calcified granuloma seen peripherally in the left lower lobe. Upper Abdomen: Adrenal glands unremarkable. Atherosclerotic change seen in abdominal aorta. Soft Tissues/Bones: Spurring is seen in the spine. Spurring is seen in the shoulder joints. Volume loss and consolidative change is seen in the left upper lobe. Small mediastinal and hilar nodes noted, greatest in the left hilar region which could be reactive or metastatic. No suspicious pulmonary nodule on the right. There is underlying emphysema. Moderate to severe coronary artery calcification in the LAD     XR CHEST PORTABLE    Result Date: 6/9/2023  EXAMINATION: ONE XRAY VIEW OF THE CHEST 6/9/2023 9:24 am COMPARISON: Chest x-ray dated 1 June 2023 HISTORY: ORDERING SYSTEM PROVIDED HISTORY: recent EVERETT lobectomy for SCC, increased confusion and cough. Please rule out pneumonia TECHNOLOGIST PROVIDED HISTORY: Reason for exam:->recent EVERETT lobectomy for SCC, increased confusion and cough. Please rule out pneumonia Reason for Exam: recent EVERETT lobectomy for SCC, increased confusion and cough. Please rule out pneumonia FINDINGS: Right-sided chest port catheter with the tip in the superior vena cava. Similar appearance of left-sided airspace disease and small left pleural effusion. Interval resolution of right upper lobe airspace disease. Stable cardiomediastinal silhouette. Similar appearance of left-sided airspace disease and small left pleural effusion. Interval improvement in right upper lobe airspace disease     XR CHEST PORTABLE    Result Date: 6/1/2023  EXAMINATION: ONE XRAY VIEW OF THE CHEST 6/1/2023 5:23 am COMPARISON: Chest x-ray dated 05/31/2023.  HISTORY: ORDERING SYSTEM PROVIDED HISTORY: evaluating atelectasis TECHNOLOGIST PROVIDED HISTORY: Reason for exam:->evaluating atelectasis Reason for Exam: evaluating atelectasis FINDINGS: LINES/TUBES/OTHER: Right subclavian port is grossly unchanged
multifocal bilateral consolidation, with aeration slightly improved on the left as compared to prior. Suspected small left apical pneumothorax, similar to prior. XR CHEST PORTABLE    Result Date: 5/29/2023  EXAMINATION: ONE XRAY VIEW OF THE CHEST 5/29/2023 5:37 am COMPARISON: 05/28/2023 HISTORY: ORDERING SYSTEM PROVIDED HISTORY: evaluating atelectasis TECHNOLOGIST PROVIDED HISTORY: Reason for exam:->evaluating atelectasis Reason for Exam: eval atelectasis FINDINGS: Dense consolidation persists throughout the left hemithorax with a stable small left apical pneumothorax. Patchy right upper lobe airspace disease is stable. The heart is enlarged with a normal appearance of pulmonary vasculature. Multiple displaced left-sided rib fractures are again noted along with left-sided chest wall emphysema. No significant interval change. XR CHEST PORTABLE    Result Date: 5/28/2023  EXAMINATION: ONE XRAY VIEW OF THE CHEST 5/28/2023 6:12 am COMPARISON: 05/27/2023 HISTORY: ORDERING SYSTEM PROVIDED HISTORY: evaluating atelectasis TECHNOLOGIST PROVIDED HISTORY: Reason for exam:->evaluating atelectasis Reason for Exam: evaluating atelectasis FINDINGS: Dense consolidation persists throughout the left hemithorax with a stable small left apical pneumothorax. Patchy right upper lobe airspace disease is unchanged. Heart size and vascularity are stable. Multiple displaced left-sided rib fractures are again noted along with extensive left-sided chest wall emphysema. No significant interval change. XR CHEST PORTABLE    Result Date: 5/27/2023  EXAMINATION: ONE XRAY VIEW OF THE CHEST 5/27/2023 5:19 pm COMPARISON: Earlier today HISTORY: ORDERING SYSTEM PROVIDED HISTORY: repeat cxr eval pneumothorax TECHNOLOGIST PROVIDED HISTORY: Reason for exam:->repeat cxr eval pneumothorax FINDINGS: The subcutaneous emphysema on the left is still identified. Consolidation and loculated pleural effusion on the left are unchanged.

## 2023-06-13 NOTE — PROGRESS NOTES
ACUTE REHAB UNIT: [address]    Patient:Cedric RIBEIRO [facility] Dx/Hx: S/P lobectomy of lung [Z90.2]   :1955  MICHELLE:[id number]  Date of Admit: 2023   Date of Discharge: 2023    Subjective:   Order for patient discharge. Reviewed discharge summary (AVS) with patient including medications, physical instructions (safety) and diet. Pt in stable condition. Reconciled Medication List - Patient:   Medications were reviewed by RN at time of discharge with patient and/or family:  []  Via EMR   []  Via health information exchange  []  Verbal (e.g. in person, telephone, video conferencing)  [x]  Paper-based (e.g. fax, copies, printouts)   []  Other Methods (e.g. texting, email, CDs)    Reconciled Medication List - Subsequent provider:   [] No, current reconciled medication list not provided to the subsequent provider. [x] Yes, current reconciled medication list provided to the subsequent provider. [] Via EMR    [] Via health information exchange  [] Verbal (e.g. in person, telephone, video conferencing)  [x] Paper-based (e.g. fax, copies, printouts)   [] Other Methods (e.g. texting, email, CDs)    Discharge disposition:  Pt was discharged via:  [x]  Wheelchair  []  Stretcher  []  Safe ambulation and use of assistive device  []  Other:     Pt was discharged to:  [x]  Private car  []  Transportation service to next destination  []  Other:     Discharge destination:  [x]  Home  []  [facility]  []  [address]  []  Other:        Discharge BIMS:  Number of word repeated after first attempt:  []  0. None []  1. One []  2. Two [x]  3. Three    Able to report correct year:  []  0. Missed by >5 years, or no answer  []  1. Missed by 2-5 years  []  2. Missed by 1 year  [x]  3. Correct    Able to report correct month:   []  0. Missed by >1 month, or no answer  []  1. Missed by 6 days to 1 month  [x]  2.  Accurate within 5 days    Able to report correct day of the
[facility]  Nephrology follow-up note           Reason for Consult: Hyponatremia  Requesting Physician:  Dr. Kezia Gillette history  Patient is resting  Sodium 132  Says he plans to eat more once out of the hospital  He is planning for discharge tomorrow     Last 24 h uop 620 mL    ROS: No fever  PSFH: No visitor    Scheduled Meds:   sertraline  50 mg Oral Daily    tiotropium  2 puff Inhalation Daily    atorvastatin  40 mg Oral Daily    enoxaparin  40 mg SubCUTAneous Daily    magnesium oxide  400 mg Oral Daily    pantoprazole  40 mg Oral QAM AC     Continuous Infusions:  PRN Meds:.prochlorperazine, melatonin, oxyCODONE, polyethylene glycol, bisacodyl, albuterol, sodium chloride (Inhalant)    History of Present Illness on 6/10/2023:    74 y.o. 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 Developed aspiration pneumonia. Patient continues to have dysphagia and is on puréed diet with thin liquids. Patient developed tachycardia along with confusion and was noted to have sodium of 126, potassium of 3.4 on 6/9.   Nephrology was consulted because of hyponatremia  IV fluid was started on 6/10 AM    Physical exam:   Constitutional:  VITALS:  BP (!) 96/57   Pulse (!) 106 Comment: increased to 130 with stairs  Temp 98.2 °F (36.8 °C) (Oral)   Resp 16   Ht 5' 11\" (1.803 m)   Wt 131 lb 12.8 oz (59.8 kg)   SpO2 98%   BMI 18.38 kg/m²   Gen: alert, awake  Neck: No JVD  Skin: Unremarkable  Cardiovascular:  S1, S2 without m/r/g   Respiratory: CTA B without w/r/r; respiratory effort normal  Abdomen:  soft, nt, nd,   Extremities: no lower extremity edema  Neuro/Psy: AAoriented times 3 ; moves all 4 ext    Data/  Recent Labs     06/12/23  0704   WBC 6.3   HGB 7.6*   HCT 22.8*   MCV 98.0
Bobby Booker  6/7/2023  [id number]    Chief Complaint: S/P lobectomy of lung    Subjective:   No acute events overnight. Today Babita Cosme is seen in his room with Speech. He reports disliking his modified diet. He has had poor PO intake. ROS: denies f/c, n/v, cp, sob    Objective:  Patient Vitals for the past 24 hrs:   BP Temp Temp src Pulse Resp SpO2 Weight   06/07/23 1139 (!) 118/55 -- -- 84 -- 97 % --   06/07/23 0910 117/61 98 °F (36.7 °C) Oral 89 16 97 % --   06/07/23 0745 -- -- -- 80 18 97 % --   06/07/23 0610 -- -- -- -- -- -- 136 lb 8 oz (61.9 kg)   06/06/23 2047 (!) 104/53 98 °F (36.7 °C) Oral 91 18 97 % --     Gen: No distress, pleasant. HEENT: Normocephalic, atraumatic. CV: Regular rate and rhythm. Resp: No respiratory distress. Abd: Soft, nontender   Ext: No edema. Neuro: Alert, oriented, appropriately interactive.    Psych: appropriate mood and affect    Wt Readings from Last 3 Encounters:   06/07/23 136 lb 8 oz (61.9 kg)   06/01/23 141 lb 15.6 oz (64.4 kg)   05/17/23 150 lb (68 kg)       Laboratory data:   Lab Results   Component Value Date    WBC 8.4 06/05/2023    HGB 8.4 (L) 06/05/2023    HCT 24.7 (L) 06/05/2023    .4 (H) 06/05/2023     06/05/2023       Lab Results   Component Value Date/Time     06/07/2023 06:41 AM    K 3.5 06/07/2023 06:41 AM    CL 99 06/07/2023 06:41 AM    CO2 22 06/07/2023 06:41 AM    BUN 5 06/07/2023 06:41 AM    CREATININE 0.9 06/07/2023 06:41 AM    GLUCOSE 113 06/07/2023 06:41 AM    CALCIUM 8.2 06/07/2023 06:41 AM        Therapy progress:  Physical therapy:  Bed Mobility:     Sit>supine:  Assistance Level: Supervision  Skilled Clinical Factors: HOB elevated  Supine>sit:  Assistance Level: Supervision  Skilled Clinical Factors: HOB elevated, use of BR, increased time to complete  Transfers:  Surface: From bed, Wheelchair, From chair with arms  Additional Factors: Hand placement cues, Increased time to complete  Device: Walker (RW and without
Cliff Post  6/8/2023  [id number]    Chief Complaint: S/P lobectomy of lung    Subjective:   No acute events overnight. Today Shyann Glover is seen in his room with Speech. He reports frustration with his pureed diet. Discussed concerns about aspiration and risk of pneumonia. Speech is planning on repeating swallow study on Monday. ROS: denies f/c, n/v, cp, sob    Objective:  Patient Vitals for the past 24 hrs:   BP Temp Temp src Pulse Resp SpO2   06/08/23 1234 (!) 98/53 -- -- 99 -- 97 %   06/08/23 0750 -- -- -- 72 18 98 %   06/08/23 0730 110/64 98 °F (36.7 °C) Oral 98 16 98 %   06/07/23 2145 -- -- -- -- 16 --   06/07/23 2100 109/64 98.9 °F (37.2 °C) Oral 92 16 98 %     Gen: No distress, pleasant. HEENT: Normocephalic, atraumatic. CV: Regular rate and rhythm. Resp: No respiratory distress. Lungs CTAB  Abd: Soft, nondistended   Ext: No edema. Neuro: Alert, oriented, appropriately interactive.    Psych: appropriate mood and affect    Wt Readings from Last 3 Encounters:   06/07/23 136 lb 8 oz (61.9 kg)   06/01/23 141 lb 15.6 oz (64.4 kg)   05/17/23 150 lb (68 kg)       Laboratory data:   Lab Results   Component Value Date    WBC 6.5 06/08/2023    HGB 8.0 (L) 06/08/2023    HCT 23.2 (L) 06/08/2023    MCV 98.8 06/08/2023     06/08/2023       Lab Results   Component Value Date/Time     06/08/2023 07:07 AM    K 3.6 06/08/2023 07:07 AM    K 3.5 06/07/2023 06:41 AM    CL 99 06/08/2023 07:07 AM    CO2 22 06/08/2023 07:07 AM    BUN 5 06/08/2023 07:07 AM    CREATININE 0.9 06/08/2023 07:07 AM    GLUCOSE 97 06/08/2023 07:07 AM    CALCIUM 8.0 06/08/2023 07:07 AM        Therapy progress:  Physical therapy:  Bed Mobility:     Sit>supine:  Assistance Level: Supervision  Skilled Clinical Factors: HOB elevated  Supine>sit:  Assistance Level: Supervision  Skilled Clinical Factors: HOB elevated, use of BR, increased time to complete  Transfers:  Surface: From bed, Wheelchair, From chair with arms  Additional Factors: Hand
Comprehensive Nutrition Assessment    Type and Reason for Visit:  Reassess    Nutrition Recommendations/Plan:   Continue pureed diet   Encourage po intakes  Continue Ensure ONS  Due to inadequate intake, discussion of palliative care vs nutrition support should be considered. Consult dietitian if tube feeding is desired and consistent with patient's plan of care. Monitor po intakes, nutrition adequacy, weights, pertinent labs, BMs     Malnutrition Assessment:  Malnutrition Status:  Severe malnutrition (06/02/23 1322)    Context:  Acute Illness     Findings of the 6 clinical characteristics of malnutrition:  Energy Intake:  50% or less of estimated energy requirements for 5 or more days  Weight Loss:  Greater than 2% over 1 week     Body Fat Loss:  Unable to assess     Muscle Mass Loss: Moderate muscle mass loss Temples (temporalis), Clavicles (pectoralis & deltoids)  Fluid Accumulation:  Unable to assess     Strength:  Not Performed    Nutrition Assessment:    Follow up: Pt remains nutritionally compromised AEB pt report of poor appetite. Pt currently on a pureed diet with Ensure ONS. PO intakes 0-25% per EMR. Pt reports that he has been eating about 25% of his meals. Noted pt only ordering 1-2 menu items at meals (pureed pineapple, pudding cup, Pepsi). Pt states that he has been trying to drink How do you roll?. Pt unsure about Ensure. Willing to try [employer], will order. Encouraged po intakes. Will monitor. Nutrition Related Findings:    BM x1 on 6/5. Na 135. Wound Type: Surgical Incision       Current Nutrition Intake & Therapies:    Average Meal Intake: 0%, 1-25%  Average Supplements Intake: Unable to assess  ADULT DIET; Dysphagia - Pureed;  No Drinking Straws  ADULT ORAL NUTRITION SUPPLEMENT; Breakfast, Dinner; Standard High Calorie/High Protein Oral Supplement    Anthropometric Measures:  Height: 5' 11\" (180.3 cm)  Ideal Body Weight (IBW): 172 lbs (78 kg)       Current Body Weight: 137 lb (62.1 kg), 82 %
Comprehensive Nutrition Assessment    Type and Reason for Visit:  Reassess    Nutrition Recommendations/Plan:   Continue pureed diet - textures and consistencies per SLP   Continue Ensure BID   Encourage PO intakes  Due to inadequate intake and severe malnutrition, discussion of palliative care vs nutrition support should be considered. Consult dietitian if tube feeding is desired and consistent with patient's plan of care. Monitor nutrition adequacy, pertinent labs, bowel habits, wt changes, and clinical progress     Malnutrition Assessment:  Malnutrition Status:  Severe malnutrition (06/02/23 1322)    Context:  Acute Illness     Findings of the 6 clinical characteristics of malnutrition:  Energy Intake:  50% or less of estimated energy requirements for 5 or more days  Weight Loss:  Greater than 2% over 1 week     Muscle Mass Loss: Moderate muscle mass loss Temples (temporalis), Clavicles (pectoralis & deltoids)    Nutrition Assessment:    Follow up: Pt nutritionally declining AEB continued poor PO intakes. PO intakes of 0-25% since admission to the ARU. Pt with confusion this date. Pt reports appetite decreased today because he \"put up the dry wall wrong. \" Family bringing in [employer] drinks, brought one for pt to consume. Encouraged PO intakes. Pt drinking ONS when RD left. Weights continue to trend down. May need to consider alternative means of nutrition d/t continued poor PO intakes. Nutrition Related Findings:    Active BS. BM on 6/5. Na 133. Wound Type: Surgical Incision       Current Nutrition Intake & Therapies:    Average Meal Intake: 0%, 1-25%  Average Supplements Intake: Unable to assess  ADULT DIET; Dysphagia - Pureed;  No Drinking Straws  ADULT ORAL NUTRITION SUPPLEMENT; Breakfast, Dinner; Standard High Calorie/High Protein Oral Supplement    Anthropometric Measures:  Height: 5' 11\" (180.3 cm)  Ideal Body Weight (IBW): 172 lbs (78 kg)       Current Body Weight: 136 lb (61.7 kg),
Daisy Kramer  6/12/2023  [id number]    Chief Complaint: S/P lobectomy of lung    Subjective:   No acute events overnight. Today Meño Mead is seen in his room with nursing present. He endorses nausea and states that his last bowel movement was several days ago. He was able to pass his swallow study this morning. ROS: denies f/c, n/v, cp, sob    Objective:  Patient Vitals for the past 24 hrs:   BP Temp Temp src Pulse Resp SpO2   06/12/23 1022 -- -- -- -- 16 --   06/12/23 0946 (!) 114/58 98.2 °F (36.8 °C) Oral 91 16 100 %   06/11/23 2044 -- -- -- -- 16 --   06/11/23 2000 101/67 97.9 °F (36.6 °C) Oral 94 16 97 %   06/11/23 1650 -- -- -- -- 16 --     Gen: No distress, pleasant. Cachectic   HEENT: Normocephalic, atraumatic. CV: RRR  Resp: No respiratory distress. Lungs CTAB  Abd: Soft, nondistended   Ext: No edema. Neuro: Alert, oriented, appropriately interactive.    Psych: appropriate mood and affect    Wt Readings from Last 3 Encounters:   06/11/23 131 lb 12.8 oz (59.8 kg)   06/01/23 141 lb 15.6 oz (64.4 kg)   05/17/23 150 lb (68 kg)       Laboratory data:   Lab Results   Component Value Date    WBC 6.3 06/12/2023    HGB 7.6 (L) 06/12/2023    HCT 22.8 (L) 06/12/2023    MCV 98.0 06/12/2023     06/12/2023       Lab Results   Component Value Date/Time     06/12/2023 07:04 AM    K 4.1 06/12/2023 07:04 AM    K 3.5 06/07/2023 06:41 AM    CL 96 06/12/2023 07:04 AM    CO2 23 06/12/2023 07:04 AM    BUN 5 06/12/2023 07:04 AM    CREATININE 0.8 06/12/2023 07:04 AM    GLUCOSE 89 06/12/2023 07:04 AM    CALCIUM 8.0 06/12/2023 07:04 AM        Therapy progress:  Physical therapy:  Bed Mobility:     Sit>supine:  Assistance Level: Modified independent  Skilled Clinical Factors: HOB elevated, use of BR  Supine>sit:  Assistance Level: Modified independent  Skilled Clinical Factors: HOB elevated, use of BR  Transfers:  Surface: From bed, Wheelchair, From chair with arms  Additional Factors: Hand placement cues, Increased time
Hospital Medicine Progress Note      Date of Admission: 6/1/2023  Hospital Day: 10    Chief Admission Complaint:  tachycardia, confusion, gen weak     Subjective:  resting, NAD or events overnight    Presenting Admission History:       74 y.o. male with a PMH of KYLE squamous cell carcinoma s/p 4 rounds of chemo with immunotherapy, COPD, former tobacco use, and HLD who presented to [facility] for a LVATS KYLE lobectomy with LN dissection with Dr. Hollie Hardin on 5/25/23. Post operative course was complicated by a concern for aspiration pneumonia. He completed a 7 day course of abx. Speech therapy was consulted. A MBS was performed and showed deep laryngeal penetration when straw was used. Speech therapy recommended puree diet, thin liquids with no straws, meds whole (one at a time) with thin liquids. He was eventually transitioned to ARU on 6/2/23. Patient with reported confusion and tachycardia today during therapy. Na 126, K 3.4, Mg 1.7, wbc 6.5 on 6/8, ECG: Sinus tachycardia, incomplete RBBB, nonspecific ST abnormality, Prolonged QT. Patient reports limited intake d/t pureed diet recs    Assessment/Plan:      Current Principal Problem:  S/P lobectomy of lung    AMS/Metabolic encephalopathy/Hyponatremia  -poor intake, SIADH, SSRI-on zoloft, prolonged hospitalization  -serum osmo, urine osmo, urine sodium <20  -IVF started  -nephrology consulted     KYLE Squamous Cell Carcinoma s/p EVERETT lobectomy  -f/u CT surg     Hypokalemia/hypomag-replete, on pureed diet per SLP recs d/t dysphagia     Depression  - per chart review weaning Zoloft per patient preference     Prolonged OTc: Ecg-Sinus tachycardia,incomplete right bundle branch block, nonspecific ST abnormality, Prolonged QT  -DC zofran       Physical Exam Performed:      General appearance:  No apparent distress  Respiratory:  Normal respiratory effort. Cardiovascular:  Regular rate and rhythm. Abdomen:  Soft, non-tender, non-distended.   Musculoskelatal:  No
Hospital Medicine Progress Note      Date of Admission: 6/1/2023  Hospital Day: 15    Chief Admission Complaint:  tachycardia, confusion, gen weak     Subjective:Denies dyspnea, chest pain, nausea, constipation    Presenting Admission History:  74 y.o. male with a PMH of KYLE squamous cell carcinoma s/p 4 rounds of chemo with immunotherapy, COPD, former tobacco use, and HLD [facility] KYLE [facility] with LN dissection with Dr. Zeferino Peguero on 5/25/23. Post operative course was complicated by a concern for aspiration pneumonia. He completed a 7 day course of abx. Speech therapy was consulted. A MBS was performed and showed deep laryngeal penetration when straw was used. Speech therapy recommended puree diet, thin liquids with no straws, meds whole (one at a time) with thin liquids. He was eventually transitioned to ARU on 6/2/23. Patient with reported confusion and tachycardia today during therapy. Na 126, K 3.4, Mg 1.7, wbc 6.5 on 6/8, ECG: Sinus tachycardia, incomplete RBBB, nonspecific ST abnormality, Prolonged QT. Patient reports limited intake d/t pureed diet recs    Assessment/Plan:      Current Principal Problem:  S/P lobectomy of lung    AMS/Metabolic encephalopathy, clinically improved  -Hyponatremia possibly contributing  -Supportive care    Hyponatremia, stable  -Poor intake likely contributing  -Na dropped to a low of 126 on 6/9, currently 131 with a normal glucose  -Serum osmo 271, urine osmo 88, urine sodium <20  -Nephrology is following, appreciate assistance    Constipation, resolved  -Per chart review, last BM was more than 3 days ago  -Discussed with RN    Squamous Cell Carcinoma. S/P lobectomy of KYLE on 5/25 with Dr. Jalil Mercedes. Monitor and replace as needed. Dysphagia. Diet consistency per SLP recs. MBS 6/12    Depression, mood stable. Weaning sertraline per patient preference, per chart review     Prolonged QTc. EKG non-acute. OVg=003.
[facility] Discharge [address] Acute Rehab Unit    Patient:Cedric Thacker     Rehab Dx/Hx: S/P lobectomy of lung [Z90.2]   :1955  WKW:[id number]  Date of Admit: 2023     Pain Effect on Sleep:  Over the past 5 days, how much of the time has pain made it hard for you to sleep at night?  []  0. Does not apply - I have not had any pain or hurting in the past 5 days  []  1. Rarely or not at all  []  2. Occasionally  [x]  3. Frequently  []  4. Almost constantly  []  8. Unable to answer    Over the past 5 days, how often have you limited your participation in rehabilitation therapy sessions due to pain?  []  0. Does not apply - I have not received rehabilitation therapy in the past 5 days  [x]  1. Rarely or not at all  []  2. Occasionally  []  3. Frequently  []  4. Almost constantly  []  8. Unable to answer    Pain Interference with Day-to-Day Activities:  Over the past 5 days, how often have you limited your day-to-day activities (excluding rehabilitation therapy session)? []  1. Rarely or not at all  []  2. Occasionally  [x]  3. Frequently  []  4. Almost constantly  []  8. Unable to answer      High-Risk Drug Classes: Use and Indication   Is taking: Check if the pt is taking any medications by pharmacological classification  Indication noted: If column 1 is checked, check if there is an indication noted for all meds in the drug class Is taking  (check all that apply) Indication noted (check all that apply)   Antipsychotic [] []   Anticoagulant [] []   Antibiotic [] []   Opioid [x] [x]   Antiplatelet [] []   Hypoglycemic (including insulin) [] []   None of the above [] []     Special Treatments, Procedures, and Programs   Check all of the following treatments, procedures, and programs that apply on discharge. On discharge (check all that apply)   Cancer Treatments    A1. Chemotherapy []   A2. IV []   A3. Oral []   A10. Other []   B1. Radiation []   Respiratory Therapies    C1.  Oxygen Therapy []
Increase patients ADLs/functional status to baseline.
[facility]: ACUTE REHAB UNIT  SPEECH-LANGUAGE PATHOLOGY      [x] Daily  [] Weekly Care Conference Note  [] Discharge    Patient:Cedric Islas      :1955  FNA:[id number]  Rehab Dx/Hx: S/P lobectomy of lung [Z90.2]    Precautions: falls  Home situation: Spouse and Daughter, grandson; indep with finances and med management. Active    ST Dx: [] Aphasia  [] Dysarthria  [] Apraxia   [x] Oropharyngeal dysphagia [x] Cognitive Impairment  [] Other:   Date of Admit: 2023  Room #: 0170/0170-01    Current functional status (updated daily):         Pt being seen for : [] Speech/Language Treatment  [] Dysphagia Treatment [x] Cognitive Treatment  [] Other:  Communication: [x]WFL  [] Aphasia  [] Dysarthria  [] Apraxia  [] Pragmatic Impairment [] Non-verbal  [] Hearing Loss  [] Other:   Cognition: [] WFL  [] Mild  [] Moderate  [x] Severe [] Unable to Assess  [] Other:  Memory: [] WFL  [] Mild  [] Moderate  [x] Severe [] Unable to Assess  [] Other:  Behavior: [x] Alert  [x] Cooperative  [x]  Pleasant  [] Confused  [] Agitated  [] Uncooperative  [x] Distractible [] Motivated  [] Self-Limiting [] Anxious  [] Other:  Endurance:  [x] Adequate for participation in SLP sessions  [] Reduced overall  [] Lethargic  [] Other:  Safety: [] No concerns at this time  [x] Reduced insight into deficits  []  Reduced safety awareness [] Not following call light procedures  [] Unable to Assess  [] Other:    Current Diet Order:ADULT DIET; Dysphagia - Pureed;  No Drinking Straws  ADULT ORAL NUTRITION SUPPLEMENT; Breakfast, Dinner; Standard High Calorie/High Protein Oral Supplement  Swallowing Precautions: Sit up for all meals and thereafter for 30 minutes, Eat with small bites (1/2 tsp; 1 tsp), No straws, and Alternate solids with liquids        Date: 2023      Tx session 1  [id number] Tx session 2  All tx needs met in session 1     Total Timed Code Min 30 0   Total Treatment Minutes 60 0   Individual Treatment Minutes 60 0   Group Treatment
[facility]: ACUTE REHAB UNIT  SPEECH-LANGUAGE PATHOLOGY      [x] Daily  [] Weekly Care Conference Note  [x] Discharge    Patient:Cedric De Luna      :1955  AC:[id number]  Rehab Dx/Hx: S/P lobectomy of lung [Z90.2]    Precautions: falls  Home situation: Spouse and Daughter, grandson; indep with finances and med management. Active    ST Dx: [] Aphasia  [] Dysarthria  [] Apraxia   [x] Oropharyngeal dysphagia [x] Cognitive Impairment  [] Other:   Date of Admit: 2023  Room #: 0170/0170-01    Current functional status (updated daily):         Pt being seen for : [] Speech/Language Treatment  [] Dysphagia Treatment [x] Cognitive Treatment  [] Other:  Communication: [x]WFL  [] Aphasia  [] Dysarthria  [] Apraxia  [] Pragmatic Impairment [] Non-verbal  [] Hearing Loss  [] Other:   Cognition: [] WFL  [] Mild  [] Moderate  [x] Severe [] Unable to Assess  [] Other:  Memory: [] WFL  [] Mild  [] Moderate  [x] Severe [] Unable to Assess  [] Other:  Behavior: [x] Alert  [x] Cooperative  [x]  Pleasant  [] Confused  [] Agitated  [] Uncooperative  [x] Distractible [] Motivated  [] Self-Limiting [] Anxious  [] Other:  Endurance:  [x] Adequate for participation in SLP sessions  [] Reduced overall  [] Lethargic  [] Other:  Safety: [] No concerns at this time  [x] Reduced insight into deficits  [x]  Reduced safety awareness [] Not following call light procedures  [] Unable to Assess  [] Other:    Current Diet Order:ADULT ORAL NUTRITION SUPPLEMENT; Breakfast, Dinner, Lunch; Standard High Calorie/High Protein Oral Supplement  ADULT DIET; Dysphagia - Pureed; 1200 ml;  No Drinking Straws  Swallowing Precautions: Sit up for all meals and thereafter for 30 minutes, Eat with small bites (1/2 tsp; 1 tsp), No straws, and Alternate solids with liquids        Date: 2023      Tx session 1  [id number] DISCHARGE SUMMARY     Total Timed Code Min 0 0   Total Treatment Minutes 60 0   Individual Treatment Minutes 60 0   Group Treatment Minutes
Occupational Therapy  Discharge Summary     Name:Cedric Mckeon  WCB:[id number]  :1955  Treatment Diagnosis: Decreased functional mobility  Diagnosis: post-op OPEN LEFT UPPER LOBECTOMY WITH SLEEVE RESECTION OF PULMONARY ARTERY AND BRONCHUS WITH MUSCLE FLAP CRYOABLATION AND INTERCOSTAL NERVE BLOCK on 23    Restrictions/Precautions  Restrictions/Precautions: Up as Tolerated, Fall Risk, General Precautions, Modified Diet  Required Braces or Orthoses?: No           Position Activity Restriction  Other position/activity restrictions: up with assist, puree, thin liquids, no straws     Goals:  Short Term Goals  Time Frame for Short Term Goals: 8 days by 2023 unless otherwise noted-EXTEND STG to 6/10  Short Term Goal 1: pt will complete toileting SPV-NOT MET- pt SBA   Short Term Goal 2: pt will complete LB dressing SPV,-MET   Short Term Goal 3: pt will complete stance sink side ADLs SPV, -MET   Short Term Goal 4: Pt will complete 10-15 BUE ex to inc ind with ADLs and activity tolerance-MET     Long Term Goals  Time Frame for Long Term Goals : 12 day sby 2023 unless otherwise noted  Long Term Goal 1: pt will complete bathing SPV-partially met-- IND for UB, SBA for LB  Long Term Goal 2: pt will complete TB dressing mod I-partially met; VALDEZ assist  Long Term Goal 3: pt will complete toileting mod I-NOT MET, pt req. SBA for safety  Long Term Goal 4: pt will complete stance sink side ADLs mod I-MET     Pt. Met 3/4 short term goals and 3/4 long term goals. Signs and Symptoms of Delirium (from CAM)  A. Acute Onset Mental Status Change  Is there evidence of an acute change in mental status from the patient's baseline? [x] 0. No [] 1. Yes    B. Inattention: Did the patient have difficulty focusing attention, for example being easily distractible or having difficulty keeping track of what was being said? [x] 0. Behavior not present    [] 1.  Behavior continuously present, does not
Occupational Therapy  Facility/Department: [facility]  Rehabilitation Occupational Therapy Daily Treatment Note    Date: 23  Patient Name: Abdirizak Simmons       Room: 0343/7870-42  MRN: [id number]  Account: [de-identified]   : 1955  (74 y.o.) Gender: male     Past Medical History:  has a past medical history of Chemotherapy-induced nausea, COPD (chronic obstructive pulmonary disease) ([facility] [address] Diverticulosis, Former smoker, Hyperlipidemia, and Squamous cell carcinoma lung, left ([facility] [address] Past Surgical History:   has a past surgical history that includes Ankle fracture surgery (Right, ); Colonoscopy; bronchoscopy; Tunneled venous port placement (Right); Lung removal, total (Left, 2023); and bronchoscopy (N/A, 2023). Restrictions  Restrictions/Precautions: Up as Tolerated, Fall Risk, General Precautions, Modified Diet  Other position/activity restrictions: up with assist, puree, thin liquids, no straws  Required Braces or Orthoses?: No    Subjective  Subjective: Pt in bed upon OT arrival, agreeable to tx. Denies pain. /53, HR 91, O2 97% on RA    Objective    ADL  Grooming/Oral Hygiene  Assistance Level: Stand by assist  Skilled Clinical Factors: Brushed teeth standing @ sink  Upper Extremity Bathing  Assistance Level: Independent  Skilled Clinical Factors: seated on TTB  Lower Extremity Bathing  Assistance Level: Contact guard assist  Skilled Clinical Factors: Stood to wash buttocks and consuelo area. 1 posterior LOB but pt able to correct  Upper Extremity Dressing  Assistance Level: Set-up  Lower Extremity Dressing  Assistance Level: Stand by assist  Skilled Clinical Factors: SBA while pt stood with grab bar to pull pants up/down  Putting On/Taking Off Footwear  Assistance Level:  Moderate assistance  Skilled Clinical Factors: Assist to doff/don TEDs, pt able to doff/don socks  Tub/Shower Transfers  Type: Shower  Assistance Level: Contact guard assist  Skilled Clinical Factors: Ambulatory transfers
Occupational Therapy  Facility/Department: [facility]  Rehabilitation Occupational Therapy Daily Treatment Note    Date: 23  Patient Name: Cele Confer       Room: [facility]/0681-84  MRN: [id number]  Account: [de-identified]   : 1955  (74 y.o.) Gender: male     Past Medical History:  has a past medical history of Chemotherapy-induced nausea, COPD (chronic obstructive pulmonary disease) ([facility] [address] Diverticulosis, Former smoker, Hyperlipidemia, and Squamous cell carcinoma lung, left ([facility] [address] Past Surgical History:   has a past surgical history that includes Ankle fracture surgery (Right, ); Colonoscopy; bronchoscopy; Tunneled venous port placement (Right); Lung removal, total (Left, 2023); and bronchoscopy (N/A, 2023). Restrictions  Restrictions/Precautions: Up as Tolerated, Fall Risk, General Precautions, Modified Diet  Other position/activity restrictions: up with assist, puree, thin liquids, no straws  Required Braces or Orthoses?: No    Subjective  Subjective: Pt in bed upon OT arrival, agreeable to tx. Denies pain. /66, HR 88, O2 97% on RA    Objective    ADL  Putting On/Taking Off Footwear  Assistance Level: Moderate assistance  Skilled Clinical Factors: Assist to doff/don TEDs, pt able to doff/don socks    Functional Mobility  Sit to Stand  Assistance Level: Supervision  Skilled Clinical Factors: multiple STS throughout session with SW  Stand to Sit  Assistance Level: Supervision  Skilled Clinical Factors: Multiple sit to stands during session with SW     OT Exercises  BUE theraband exercises using yellow theraband (forward punches, bicep curls, tricep pull downs, horz ab/adduction and overhead punches), 15 reps x2 sets each exercises. Pt tolerated well with rest breaks provided between exercises    Static Standing Balance Exercises: Pt stood for approx 5 mins + 3 mins (2 seperate trials) while engaged in bimanual task (folding laundry), no LOB noted.  Pt requests to sit after approx 5 mins
Occupational Therapy  Facility/Department: [facility]  Rehabilitation Occupational Therapy Daily Treatment Note    Date: 23  Patient Name: Esau Perez       Room: 19/7994-50  MRN: [id number]  Account: [de-identified]   : 1955  (74 y.o.) Gender: male     Past Medical History:  has a past medical history of Chemotherapy-induced nausea, COPD (chronic obstructive pulmonary disease) ([facility] [address] Diverticulosis, Former smoker, Hyperlipidemia, and Squamous cell carcinoma lung, left ([facility] [address] Past Surgical History:   has a past surgical history that includes Ankle fracture surgery (Right, ); Colonoscopy; bronchoscopy; Tunneled venous port placement (Right); Lung removal, total (Left, 2023); and bronchoscopy (N/A, 2023). Restrictions  Restrictions/Precautions: Up as Tolerated, Fall Risk, General Precautions  Other position/activity restrictions: up with assist  Required Braces or Orthoses?: No    Subjective  Subjective: Pt in bed upon OT arrival, agreeable to tx. Denies pain. /55, HR 98, O2 93% on RA    Objective    Functional Mobility  Assistance Level: Contact guard assist  Skilled Clinical Factors: Pt ambulated approx 50 ft x2 room<>therapy gym using RW; cues to stay inside RW  Sit to Stand  Assistance Level: Contact guard assist  Skilled Clinical Factors: with SW  Stand to Sit  Assistance Level: Contact guard assist  Skilled Clinical Factors: Multiple sit to stands during session with SW, cues for hand placement     OT Exercises  Thereaband exericses seated edge of mat table: forward punches, biep curls, tricep pull downs; 10 reps each arm  Static Standing Balance Exercises: Pt stood for 1-2 mins x3 trials before requiring seated rest break while engaged in [employer] board game; able to maintain balance without UE support    Assessment   Pt tolerated today's session fairly well with rest breaks provided throughout. /55 in supine, 100/53 seated EOB,  and 109/52 in standing with TEDs on.  O2
Occupational Therapy  Facility/Department: [facility]  Rehabilitation Occupational Therapy Daily Treatment Note    Date: 23  Patient Name: Escobar Nam       Room: [facility]6404-75  MRN: [id number]  Account: [de-identified]   : 1955  (74 y.o.) Gender: male     Past Medical History:  has a past medical history of Chemotherapy-induced nausea, COPD (chronic obstructive pulmonary disease) ([facility] [address] Diverticulosis, Former smoker, Hyperlipidemia, and [facility] Past Surgical History:   has a past surgical history that includes Ankle fracture surgery (Right, ); Colonoscopy; bronchoscopy; Tunneled venous port placement (Right); Lung removal, total (Left, 2023); and bronchoscopy (N/A, 2023). Restrictions  Restrictions/Precautions: Up as Tolerated, Fall Risk, General Precautions, Modified Diet  Other position/activity restrictions: up with assist, puree, thin liquids, no straws  Required Braces or Orthoses?: No    Subjective  Subjective: Pt in bed upon OT arrival, agreeable to tx. Denies pain. /72, HR 96, O2 93% on RA    Objective    ADL  Upper Extremity Dressing  Assistance Level: Set-up  Lower Extremity Dressing  Assistance Level: Stand by assist  Skilled Clinical Factors: SBA while pt stood with RW to pull pants up/down  Putting On/Taking Off Footwear  Assistance Level: Set-up  Skilled Clinical Factors: figure 4 sit to doff/don socks    Functional Mobility  Supine to Sit  Assistance Level: Independent  Skilled Clinical Factors: wiht HOB elevated  Sit to Stand  Assistance Level: Stand by assist  Skilled Clinical Factors: multiple STS throughout session with SW   OT Exercises  Exercise Treatment: BUE therex using 2# free weight (bicep curls, forward punches, ER/IR, sup/pro) 10 reps x2 sets each exercise with rest breaks provided in between sets     Assessment   Pt's HR initially up to 127 upon initial stand.  RN notified and suggested deferring full shower until
Physical Therapy  Discharge Summary    Name:Cedric Love  JFB:[id number]  :1955  Treatment Diagnosis: Decreased functional mobility  Diagnosis: post-op OPEN LEFT UPPER LOBECTOMY WITH SLEEVE RESECTION OF PULMONARY ARTERY AND BRONCHUS WITH MUSCLE FLAP CRYOABLATION AND INTERCOSTAL NERVE BLOCK on 23    Restrictions/Precautions  Restrictions/Precautions: Up as Tolerated, Fall Risk, General Precautions, Modified Diet  Required Braces or Orthoses?: No           Position Activity Restriction  Other position/activity restrictions: up with assist, puree, thin liquids, no straws     Goals:                  Short Term Goals  Time Frame for Short Term Goals: 23  Short Term Goal 1: Pt will perform bed mobility with Mami -- : GOAL MET Mami  Short Term Goal 2: Pt will perform transfers with Sup and RW -- : GOAL MET Ind  Short Term Goal 3: Pt will perform 50 ft of amb with RW and Sup -- : GOAL MET sup with RW >150'  Short Term Goal 4: Pt will navigate 6 steps with UHR and CGA -- : GOAL NOT MET 4 steps with HR SBA            Long Term Goals  Time Frame for Long Term Goals : 23  Long Term Goal 1: Pt will perform bed mobility with indep -- : GOAL MET Ind  Long Term Goal 2: Pt will perform transfers with indep and LRAD -- : GOAL MET MAMI with RW  Long Term Goal 3: Pt will perform 100 ft of amb with LRAD and Indep -- : GOAL NOT MET x 150' with RW with Sup  Long Term Goal 4: Pt will navigate 12 steps with UHR and indep -- : GOAL NOT MET x 4 steps with HR with SBA  Long Term Goal 5: Pt will perform car transfer with indep -- : GOAL NOT MET SBA with RW    Pt. Met 3/4 short term goals and 2/5 long term goals. STG for stairs and LTG for Ind with gait, stairs and car t/f not met at this time. Limited by pain, fatigue, activity tolerance and HR. Pt requires grossly SBA to Sup for mobility with RW and will have 24hrA at d/c. Pt reports he will not have to complete stairs at home.     Pt.
Physical Therapy  Facility/Department: [facility]  Rehabilitation Physical Therapy Treatment Note    NAME: Esau Perez  : 1955 (74 y.o.)  MRN: [id number]  CODE STATUS: Full Code    Date of Service: 23       Restrictions:  Restrictions/Precautions: Up as Tolerated, Fall Risk, General Precautions, Modified Diet  Position Activity Restriction  Other position/activity restrictions: up with assist, puree, thin liquids, no straws     SUBJECTIVE  Subjective  Subjective: Pt supine in bed on approach, agreeable to PT tx  Pain: Pt denies c/o pain this am when asked            OBJECTIVE  Orientation  Overall Orientation Status: Within Normal Limits    Functional Mobility  Supine to Sit  Assistance Level: Supervision  Skilled Clinical Factors: HOB elevated, use of BR, increased time to complete    Balance  Sitting Balance: Supervision  Standing Balance: Stand by assistance statically without AD  Dynamic standing balance:   X 2 set x 10 reps reciprocal toe taps to 6\" step CGA to Latanya without AD   X 2 set gait 15' forwards + 15' retrowalking CGA progressing to SBA   X 2 set 13' R + 15' L lateral stepping without AD with CGA progressing to SBA   X 2 set dynamic reaching to CL side to grab x 7 cones from 10\" step (1 time to R and 1 time to L) CGA for balance   X 2 set dynamic reaching to CL side to grab x 7 cones from 6\" step (1 time to R and 1 time to L) CGA for balance    Completed to improve dynamic balance with reaching, promote improved strength and coordination and decrease risk of falls. Transfers  Surface: From bed; Wheelchair;From chair with arms  Additional Factors: Hand placement cues; Increased time to complete  Device: Walker (RW and without AD)  Sit to Stand  Assistance Level: Stand by assist  Skilled Clinical Factors: Multiple t/f throughout session with RW and without AD with SBA, cues for hand placement especially with RW  Stand to Sit  Assistance Level: Stand by assist  Skilled Clinical Factors:
Physical Therapy  Facility/Department: [facility]  Rehabilitation Physical Therapy Treatment Note    NAME: Tai Hagen  : 1955 (74 y.o.)  MRN: [id number]  CODE STATUS: Full Code    Date of Service: 23       Restrictions:  Restrictions/Precautions: Up as Tolerated, Fall Risk, General Precautions, Modified Diet  Position Activity Restriction  Other position/activity restrictions: up with assist, puree, thin liquids, no straws     SUBJECTIVE  Subjective  Subjective: Pt supine in bed on approach, agreeable to PT tx with encouragement d/t pain and nausea  Pain: Pt reports pain in back and side -5/10, reports he recieved pain medication prior to start of session               OBJECTIVE  Orientation  Overall Orientation Status: Within Functional Limits    Functional Mobility  Roll Left  Assistance Level: Independent  Skilled Clinical Factors: HOB flat without BR  Roll Right  Assistance Level: Independent  Skilled Clinical Factors: HOB flat without BR  Sit to Supine  Assistance Level: Independent  Skilled Clinical Factors: HOB flat without BR  Supine to Sit  Assistance Level: Independent  Skilled Clinical Factors: HOB flat without BR  Balance  Sitting Balance: Independent  Standing Balance: Supervision  Standing Balance  Activity: Sup with RW, SBA with reacher to retrieve item from ground  Transfers  Surface: From bed; Wheelchair;From chair with arms  Device: Walker (RW)  Sit to Stand  Assistance Level: Modified independent  Skilled Clinical Factors: Multiple t/f throughout session with RW with Edith  Stand to Sit  Assistance Level: Modified independent  Skilled Clinical Factors: Multiple t/f throughout session with RW with Edith  Bed To/From Chair  Technique: Stand pivot  Assistance Level: Modified independent  Skilled Clinical Factors: With [employer]  Assistance Level: Stand by assist  Skilled Clinical Factors:  With RW, cues for hand placement and anterior WS/positioning, posterior LOB while rising from
Riaz Polanco  6/6/2023  [id number]    Chief Complaint: S/P lobectomy of lung    Subjective:   No acute events overnight. Today Anusha Luu is seen in his room, resting in bed. He reports some continued pain. He is frustrated by his modified diet. ROS: denies f/c, n/v, cp, sob    Objective:  Patient Vitals for the past 24 hrs:   BP Temp Temp src Pulse Resp SpO2 Weight   06/06/23 1332 (!) 105/55 -- -- 82 -- 97 % --   06/06/23 0845 (!) 105/50 98.6 °F (37 °C) Oral 82 18 95 % --   06/06/23 0725 -- -- -- 86 16 96 % --   06/06/23 0603 -- -- -- -- -- -- 137 lb (62.1 kg)   06/05/23 2145 127/62 98.1 °F (36.7 °C) Oral 89 16 95 % --     Gen: No distress, pleasant. HEENT: Normocephalic, atraumatic. CV: Regular rate and rhythm. Resp: No respiratory distress. Abd: Soft, nontender   Ext: No edema. Neuro: Alert, oriented, appropriately interactive.      Wt Readings from Last 3 Encounters:   06/06/23 137 lb (62.1 kg)   06/01/23 141 lb 15.6 oz (64.4 kg)   05/17/23 150 lb (68 kg)       Laboratory data:   Lab Results   Component Value Date    WBC 8.4 06/05/2023    HGB 8.4 (L) 06/05/2023    HCT 24.7 (L) 06/05/2023    .4 (H) 06/05/2023     06/05/2023       Lab Results   Component Value Date/Time     06/05/2023 08:00 AM    K 4.1 06/05/2023 08:00 AM    K 3.8 05/28/2023 04:20 AM     06/05/2023 08:00 AM    CO2 20 06/05/2023 08:00 AM    BUN 8 06/05/2023 08:00 AM    CREATININE 0.8 06/05/2023 08:00 AM    GLUCOSE 82 06/05/2023 08:00 AM    CALCIUM 8.3 06/05/2023 08:00 AM        Therapy progress:  Physical therapy:  Bed Mobility:     Sit>supine:  Assistance Level: Supervision  Skilled Clinical Factors: HOB elevated  Supine>sit:  Assistance Level: Supervision  Skilled Clinical Factors: HOB elevated, use of BR, increased time to complete  Transfers:  Surface: From bed, Wheelchair  Additional Factors: Hand placement cues, Increased time to complete  Device: Walker (RW and without AD)  Sit>stand:  Assistance Level: Stand
Physical Exam Performed:      General appearance:  No apparent distress  Respiratory:  Normal respiratory effort. Room air  Cardiovascular:  Regular rate and rhythm  Abdomen:  Soft, non-tender, non-distended  Musculoskelatal:  No edema  Neurologic:  Non-focal  Psychiatric:  Alert and oriented    /67   Pulse 94   Temp 97.9 °F (36.6 °C) (Oral)   Resp 16   Ht 5' 11\" (1.803 m)   Wt 131 lb 12.8 oz (59.8 kg)   SpO2 97%   BMI 18.38 kg/m²     Diet: ADULT ORAL NUTRITION SUPPLEMENT; Breakfast, Dinner, Lunch; Standard High Calorie/High Protein Oral Supplement  ADULT DIET; Dysphagia - Soft and Bite Sized; 1200 ml; No Drinking Straws    DVT Prophylaxis: [x]PPx LMWH  []SQ Heparin  []IPC/SCDs  []Eliquis  []Xarelto  []Coumadin  []Other -      Code status: Full Code    PT/OT Eval Status:   Pt currently admitted in ARU    Anticipated Discharge Day/Date: per attending    Anticipated Discharge Location: []Home  [x]HHC  []SNF  []Acute Rehab  []ECF  []LTAC  []Hospice  []Other -      Consults:      IP CONSULT TO CASE MANAGEMENT  IP CONSULT TO DIETITIAN  IP CONSULT TO INTERNAL MEDICINE  IP CONSULT TO NEPHROLOGY  IP CONSULT TO Fayeguluz elena [address]      This patient has a high likelihood of being discharged tomorrow and is appropriate for A1 Discharge Unit in AM pending clinical course overnight: []Yes  [x]No    ------------------------------------------------------------------------------------------------------------------------------------------------------------------------    MDM      [] High (any 2)    A. Problems (any 1)   Acute/Chronic Illness/injury posing threat to life or bodily function:    [] Severe exacerbation of chronic illness:    ---------------------------------------------------------------------  B.  Risk of Treatment (any 1)   [] Drugs/treatments that require intensive monitoring for toxicity include:    [] Change in code status:    [] Decision to escalate care:    [] Major surgery/procedure with associated
Recommendations: 24 hour supervision or assist;Home with Home health OT;S Level 1  OT Equipment Recommendations  Other: CTA  Safety Devices  Type of devices: Call light within reach; Left in bed;Bed alarm in place    Patient Education  Education  Education Given To: Patient  Education Provided: Plan of Care;Role of Therapy  Education Provided Comments: role of OT, safety with transfers, BABAR hose placement/education, safety with ADLs  Education Method: Verbal  Barriers to Learning: Cognition  Education Outcome: Verbalized understanding;Continued education needed    Plan  Occupational Therapy Plan  Current Treatment Recommendations: Strengthening;ROM;Balance training;Functional mobility training; Endurance training;Gait training;Pain management; Safety education & training;Patient/Caregiver education & training;Equipment evaluation, education, & procurement;Positioning;Self-Care / ADL; Home management training    Goals  Patient Goals   Patient goals : to go home  Short Term Goals  Time Frame for Short Term Goals: 8 days by 6/08/2023 unless otherwise noted-EXTEND STG to 6/10  Short Term Goal 1: pt will complete toileting SPV, progressing 6/9 CGA  Short Term Goal 2: pt will complete LB dressing SPV, progressing 6/9 min A.   Short Term Goal 3: pt will complete stance sink side ADLs SPV, progressing 6/9 SBA  Short Term Goal 4: Pt will complete 10-15 BUE ex to inc ind with ADLs and activity tolerance-MET 6/6  Long Term Goals  Time Frame for Long Term Goals : 12 day sby 6/12/2023 unless otherwise noted  Long Term Goal 1: pt will complete bathing SPV  Long Term Goal 2: pt will complete TB dressing mod I  Long Term Goal 3: pt will complete toileting mod I  Long Term Goal 4: pt will complete stance sink side ADLs mod I      Therapy Time   Individual Concurrent Group Co-treatment   Time In 1430         Time Out 1500         Minutes 30         Timed Code Treatment Minutes: [address] [facility],[address], OT
Transfers Comments: RW  Assessment        SLP                Body mass index is 19.8 kg/m². Assessment and Plan:  Connie Fleming is a 74year old male with a past medical history significant for KYLE squamous cell carcinoma s/p 4 rounds of chemo with immunotherapy, COPD, former tobacco use, and HLD who presented to [facility] on 5/25/23 for open left upper lobectomy. He was admitted to [facility] on 6/1/23 due to functional deficits below his baseline. KYLE Squamous Cell Carcinoma s/p EVERETT lobectomy  - incision care  - PT, OT, ST     Dysphagia  - on modified diet  - ST    Orthostatic Hypotension  - BABAR hose     Pulmonary Insufficiency  COPD  - Spiriva  - wean oxygen for goal SpO2>88%     HAP  - concerns for aspiration pneumonia  - augmentin to complete course     Hypokalemia  - started daily replacement  - monitor      Anemia  - Hb stable  - monitor and transfuse for Hb<7     Depression  - start weaning Zoloft per patient preference     Bowels: adjust medications as needed for regular bowel movements     Bladder: Check PVR x 3. [address] if PVR > 200ml or if any volume is > 500 ml. Sleep: melatonin provided prn. PPX  DVT: lovenox  GI: PPI     Follow up appointments: CT surgery, Oncology, PCP    Aliza Munguia.  Janet Butler MD 6/5/2023, 1:14 PM
Unit;Within Room  Activity Comments: Distances limited by fatigue SOB and reports of dizziness. HR up to 110, SpO2 maintains >95% on RA  Additional Factors: Verbal cues; Hand placement cues; Increased time to complete  Assistance Level: Stand by assist  Gait Deviations: Decreased step length bilateral;Slow prudence;Narrow base of support; Unsteady gait; Decreased trunk rotation;Decreased heel strike right;Decreased heel strike left  Skilled Clinical Factors: Slow reciprocal pattern, B decreased toe clerance and heel strike, increased kyphosis, mildly unsteady without overt LOB. Stairs  Stair Height: 6''  Device: Bilateral handrails  Number of Stairs: 4  Additional Factors: Verbal cues; Increased time to complete;Hand placement cues; Non-reciprocal going up;Non-reciprocal going down  Assistance Level: Contact guard assist  Skilled Clinical Factors: Increased time to complete, min cues for sequence and safety             PT Exercises  Exercise Treatment: Seated BLE TE: AP x2  15, LAQ 2 x 15, marches 2 x 15, hip abduction 2 x 15 with blue TB; hamstring curls with blue TB 2 x 15; with 1.5 lb B ankle weights; cues for sequence/technique      ASSESSMENT/PROGRESS TOWARDS GOALS  Vital Signs  Pulse: 82 (up to 110 with mobility)  BP: (!) 105/55 (At rest, following mobility 121/58)  MAP (Calculated): 72  SpO2: 97 %  O2 Device: None (Room air)    Assessment  Assessment: Pt seen in am for PT tx, pt reports fatigue and pain but is agreeable to session. Pt does require increased seated rest breaks d/t fatigue and nausea during session. Pt with soft BP but no drop noted with mobility with tedhose donned at start of session. Pt demonstrates bed mobility with Sup, t/f with RW and without AD with SBA, gait up to 50' with RW with SBA. Pt will benefit from continued skilled PT in ARU to address above deficits, will continue to progress mobility as tolerated. Activity Tolerance: Patient limited by endurance; Patient tolerated treatment
complete toileting mod I-NOT MET, pt req.  SBA for safety  Long Term Goal 4: pt will complete stance sink side ADLs mod I-MET 6/12    Therapy Time   Individual Concurrent Group Co-treatment   Time In 1000         Time Out 1100         Minutes 60         Timed Code Treatment Minutes: [address], OT
cues  -jaw jut and hold 5 sec: x10      Cognitive Goals:   Short Term Goals  Time Frame for Short Term Goals: 8 Days (23)    Goal 1: Pt will complete graded recall tasks using compensatory strategies with 70% acc and mod cues   5 Novel Word recall on SLUMS   -immediate recall: 3/5 indep  -5 min delay: 1/5 indep;+4 given mod - max cues     Paragraph Retention / Auditory Comprehension on SLUMS   -pt completed with 6/8 acc    Orientation:  -pt indep oriented to year and state  -pt unaware of EBONY   -pt required min cues for month  -pt stated the city [place]. [place]      Goal 2: Pt will complete executive function tasks (time, money, meds, etc) with 70% acc given mod cues   Calculation on SLUMS  -pt completed with 1/3 acc  -accurate addition, incorrect subtraction    Clock Drawing on SLUMS   -pt completed with 0/4 acc   -pt put numbers, 1, 3, 6 - 20 on clock      Goal 3: Pt will complete verbal and visual reasoning tasks with 70% acc given mod cues. Goal not directly targeted. Goal 4: Pt will complete problem solving and thought organization tasks with 70% given mod cues. Divergent Naming on SLUMS   -pt received 0/3 points  -pt named 1 animal in 1 min     Backward Number Repetition on SLUMS  -pt completed with 0/2 acc  -pt only repeated forward order over and over      Other areas targeted: Repeat SLUMS -   -orientation: 2/3  -calculation: 1/3  -divergent namin/3  -delayed recall: 1/5  -backward number repetition: 0/2  -clock drawin/4  -following commands: 2/2  -paragraph retention:      Education:   SLP ed re: rationale and results of repeat SLUMS, safe swallow strategies     Safety Devices: [x] Call light within reach  [] Chair alarm activated  [x] Bed alarm activated  [] Other: [] Call light within reach  [] Chair alarm activated  [] Bed alarm activated  [] Other:    Assessment: Pt alert and cooperative, although did appear more confused this AM - MD made aware.   Pt very distracted by
pulling up fully on LEs. Pt required CGA for transfers this date with increased confusion and poor safety. Pt BP low initially but improving with movement. Pt's HR increasing with activity from 111 at rest to 120s at EOB and 140s after dressing and mobility back from bathroom. Continue POC. Activity Tolerance: Patient limited by endurance; Patient tolerated treatment well;Treatment limited secondary to medical complications  Discharge Recommendations: 24 hour supervision or assist;Home with Home health OT;S Level 1  OT Equipment Recommendations  Other: CTA  Safety Devices  Safety Devices in place: Yes  Type of devices: Gait belt;Call light within reach;Nurse notified; Left in chair;Chair alarm in place    Patient Education  Education  Education Given To: Patient  Education Provided: Role of Therapy;Transfer Training; Safety; Mobility Training;Energy Conservation;ADL Function;Plan of Care  Education Provided Comments: role of OT, safety with transfers, BABAR hose placement/education, safety with ADLs  Education Method: Verbal  Barriers to Learning: Cognition  Education Outcome: Verbalized understanding;Continued education needed; Unable to demonstrate understanding    Plan  Occupational Therapy Plan  Times Per Week: 5-7 days/wk  Current Treatment Recommendations: Strengthening;ROM;Balance training;Functional mobility training; Endurance training;Gait training;Pain management; Safety education & training;Patient/Caregiver education & training;Equipment evaluation, education, & procurement;Positioning;Self-Care / ADL; Home management training    Goals  Short Term Goals  Time Frame for Short Term Goals: 8 days by 6/08/2023 unless otherwise noted-EXTEND STG to 6/10  Short Term Goal 1: pt will complete toileting SPV, progressing 6/9 CGA  Short Term Goal 2: pt will complete LB dressing SPV, progressing 6/9 min A.   Short Term Goal 3: pt will complete stance sink side ADLs SPV, progressing 6/9 SBA  Short Term Goal 4: Pt will complete
small bites/sips, slow rate, alternate liquids and solids, double swallow. Pt required min cues to implement. Goal 3: The pt will complete laryngeal/ pharyngeal strengthening exercises 10/10 given min cues    Pt completed the following exercises given min cues:  -effortful swallow: x10  - pt demonstrated excess pumping before swallow   -isometric lingual press for 5 sec: x10  -jaw jut and hold 5 sec: x10  -jaw opening against resistance: x10  -mary maneuver: x10  - during maneuver pt demonstrated excess pumping   Goal not directly targeted. Cognitive Goals:   Short Term Goals  Time Frame for Short Term Goals: 8 Days (06/08/23)    Goal 1: Pt will complete graded recall tasks using compensatory strategies with 70% acc and mod cues   Memory and mental manipulation   - pt immediately recalls 3 items listed and puts them in order of occurrence   - e.g. 16,19,17  -Immediate recall- completed with 70% acc and moderate cues   -Manipulation (placing the 3 words in the correct order): completed 30% acc indep and improved to 90% with max cues    Goal not directly targeted. Goal 2: Pt will complete executive function tasks (time, money, meds, etc) with 70% acc given mod cues   Money Word Problems   - answering questions about money values   -e.g. \"how many pennies are in a nickel? \"   - completed with 85% acc and max cues    Goal not directly targeted. Goal 3: Pt will complete verbal and visual reasoning tasks with 70% acc given mod cues. Not targeted this session. Goal not directly targeted. Goal 4: Pt will complete problem solving and thought organization tasks with 70% given mod cues. Following written directions:  - following directions to manipulate picture above  -e.g. \"[place] the shortest word\"  - completed with 100% acc indep Goal not directly targeted.     Other areas targeted: N/a     Education:   SLP ed re: role of SLP, rationale for cog tasks, recall strategies, calc strategies    SLP edu pt
Prevention Strategies  Education Provided Comments: Educated on role of PT, safe progression of mobility, safe use of AD, importance of OOB mobility and gait, use of tedhose and s/s of [place]  Education Method: Verbal;Demonstration  Barriers to Learning: None  Education Outcome: Verbalized understanding;Demonstrated understanding;Continued education needed        Therapy Time   Individual Concurrent Group Co-treatment   Time In 1230         Time Out 1330         Minutes 60           Timed Code Treatment Minutes: [facility], PT, DPT 06/08/23 at 2:58 PM
SpO2>88%     Hypokalemia, improved  - holding daily replacement and monitoring     Anemia  - Hb stable  - monitor and transfuse for Hb<7     Depression  - start weaning Zoloft per patient preference    HAP, resolved  - concerns for aspiration pneumonia  - completed course of augmentin     Bowels: adjust medications as needed for regular bowel movements     Bladder: Check PVR x 3. [address] if PVR > 200ml or if any volume is > 500 ml. Sleep: melatonin provided prn. PPX  DVT: lovenox  GI: PPI     Follow up appointments: CT surgery, Oncology, PCP    Services: [facility] PT, OT, [address] [place] [address] Dr, nursing  EDOD: 6/13    Stacie Hutton.  Bing Nascimento MD 6/9/2023, 3:42 PM
re: current diet recs, MBSS results, POC with trials of solids in upcoming sessions. Pt's wife verbalized understanding and all questions answered. Plan: Continue as per plan of care. Additional Information:     Barriers toward progress: inability to manage medications, will need assist, inability to manage finances, will need assist, inability to effectively communicate in emergent situations (ex: calling 911, stating name, reduced intelligibility, etc), severity of cognition (severe) with reduced insight negatively impacting safety/independence, inability to recall sternal precautions, inability to recall and adhere to safe swallow strategies placing pt at risk for aspiration and inability to adhere to diet recommendations regarding least restrictive diet  Discharge recommendations:  [] Home independently  [] Home with assistance [x]  24 hour supervision  [] ECF [] Other:  Continued Tx Upon Discharge: ? [] Yes [] No [x] TBD based on progress while on ARU [] Vital Stim indicated [] Other:   Estimated discharge date: TBD    Interventions used this date:  [] Speech/Language Treatment  [] Instruction in HEP [] Group [] Dysphagia Treatment [x] Cognitive Treatment   [] Other:       Total Time Breakdown / Charges    Time in Time out Total Time / units   Cognitive Tx 0830 0915 45 min/ 3 units   Speech Tx 0915 0930 15 min/ 1 unit   Dysphagia Tx          Electronically Signed by     Hodges Sandhoff  SLP  Clinician     [place] [id number] [facility] #[id number]  Speech Language Pathologist
sized (Jell-O) with therapist and double swallowed after every bite. Pt needed min cues to alternate between sips and bite. Pt did well with swallow exercises and improved with hard swallow and mary maneuver. Pt did well with safety reasoning task and memory task. Pt needed moderate cues to help with math problems and benefited from writing out the problems as a visual aid. Continue goals above. Plan: Continue as per plan of care. Additional Information:     Barriers toward progress: inability to manage medications, will need assist, inability to manage finances, will need assist, inability to effectively communicate in emergent situations (ex: calling 911, stating name, reduced intelligibility, etc), severity of cognition (severe) with reduced insight negatively impacting safety/independence, inability to recall sternal precautions, inability to recall and adhere to safe swallow strategies placing pt at risk for aspiration and inability to adhere to diet recommendations regarding least restrictive diet  Discharge recommendations:  [] Home independently  [] Home with assistance [x]  24 hour supervision  [] ECF [] Other:  Continued Tx Upon Discharge: ? [x] Yes [] No [] TBD based on progress while on ARU [] Vital Stim indicated [] Other:   Estimated discharge date: 06/13/23    Interventions used this date:  [] Speech/Language Treatment  [] Instruction in HEP [] Group [] Dysphagia Treatment [x] Cognitive Treatment   [] Other:       Total Time Breakdown / Charges    Time in Time out Total Time / units   Cognitive Tx 1000 1030 30 min / 2 units    Speech Tx      Dysphagia Tx 0930 1000 30 min / 1 unit        Electronically Signed by     April Segundo  SLP  Clinician     Shamir Moya MA [address] [place] [address]  Speech Language Pathologist
with indep    PLAN OF CARE/SAFETY  Physcial Therapy Plan  General Plan: 5-7 times per week  Specific Instructions for Next Treatment: progress mobility as tolerated  Current Treatment Recommendations: Strengthening;ROM; Home exercise program;Safety education & training;Gait training;Balance training; Therapeutic activities; Functional mobility training;Transfer training;Neuromuscular re-education;Patient/Caregiver education & training; Endurance training;Positioning;Pain management;Stair training;Manual;Equipment evaluation, education, & procurement  Safety Devices  Type of Devices: All fall risk precautions in place;Gait belt;Nurse notified; Patient at risk for falls; Chair alarm in place; Bed alarm in place; Left in bed    EDUCATION  Education  Education Given To: Patient  Education Provided: Role of Therapy;Plan of Care;Precautions; Safety;ADL Function;Mobility Training;Transfer Training;Equipment; Energy Conservation;DME/Home Modifications; Fall Prevention Strategies  Education Provided Comments: Educated on role of PT, safe progression of mobility, safe use of AD, importance of OOB mobility and gait, use of tedhose and s/s of [place]  Education Method: Verbal;Demonstration  Barriers to Learning: None  Education Outcome: Verbalized understanding;Demonstrated understanding;Continued education needed        Therapy Time   Individual Concurrent Group Co-treatment   Time In 1230         Time Out 1330         Minutes 60           Timed Code [facility] at 4:12 PM
within reach;Gait belt;Nurse notified; Patient at risk for falls; Left in chair;Chair alarm in place    EDUCATION  Education  Education Given To: Patient  Education Provided: Role of Therapy;Plan of Care;Precautions; Safety;ADL Function;Mobility Training;Transfer Training;Equipment; Energy Conservation;DME/Home Modifications; Fall Prevention Strategies  Education Provided Comments: Educated on role of PT, safe progression of mobility, safe use of AD, importance of OOB mobility and gait, use of tedhose and s/s of [place]  Education Method: Verbal;Demonstration  Barriers to Learning: None  Education Outcome: Verbalized understanding;Demonstrated understanding;Continued education needed        Therapy Time   Individual Concurrent Group Co-treatment   Time In 0930         Time Out 1030         Minutes 60           Timed Code Treatment Minutes: 914 [facility] ROSALINO Ayala DPT 06/05/23 at 11:53 AM

## 2024-01-12 NOTE — DISCHARGE SUMMARY
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 Post operative course has been notable for increased oxygen requirement. Pulmonology was consulted and on  he underwent bronchoscopy. MBS  showing aspiration and he was placed on a pureed diet. He was admitted to [facility] on 23 due to functional deficits below his baseline. Inpatient Rehabilitation Course:   Mary Ann is a 74 y.o. male admitted to inpatient rehabilitation on 2023 with S/P lobectomy of lung. The patient participated in an aggressive multidisciplinary inpatient rehabilitation program involving 3 hours of therapy per day, at least 5 days per week. Discharging home with family. Home care PT, OT, ST, and nursing ordered. Patient will follow-up with CT Surgery and PCP. Impairments: impaired mobility and ADLs, impaired gait and balance    Medical Management:    KYLE Squamous Cell Carcinoma s/p EVERETT lobectomy  - incision open to air  - PT, OT, ST     AMS, resolved  - suspected due to hyponatremia  - UA negative  - Medicine following, appreciate assistance     Hyponatremia  - Medicine and Nephrology consulted, appreciate assistance.    - encourage nutrition and protein intake  - does not Pt presents to the ED c/o cough and congestion starting approximately 3 weeks ago.  Has been on steroids antibiotics and decongestions and cough medications without relief.  Denies any fevers, cough seems to be worse at nighttime.     On exam,   General: No acute distress, nontoxic  HEENT: Mucous membranes moist, atraumatic, EOMI  Neck: Full ROM  Pulm: Symmetric chest rise, nonlabored, lungs with scattered rhonchi bilaterally but no overt wheezes or rails  Cardiovascular: Regular rate and rhythm, intact distal pulses  GI: Soft, nontender, nondistended, no rebound, no guarding, bowel sounds present  MSK: Full ROM, no deformity  Skin: Warm, dry  Neuro: Awake, alert, oriented x 4, GCS 15, moving all extremities, no focal deficits  Psych: Calm, cooperative      I wore an N95 mask, eye protection, and gloves used during this encounter.       Plan: Initial concern for prolonged viral process, commune acquired pneumonia, heart failure, among others.  Plan for x-ray, viral swab, and reevaluation with results.    ED Course as of 01/12/24 1509   Fri Jan 12, 2024   1212 My independent interpretation of the chest x-ray is no cardiomegaly or acute infiltrate. [KA]   1254 XR Chest AP  Per my independent interpretation of the chest x-ray, no evidence of pneumothorax or obvious pneumonia [DC]   1306 I reassessed the patient, counseled him on his lab and imaging results.  He did test positive for coronavirus OC 43.  I do think this explains his persistent postinfectious cough.  He would like to continue his OTC Mucinex DM, have offered alternative medication for cough and he declined.  He would like to try an inhaler which I think is reasonable and have prescribed him albuterol.  He can follow-up with his PCP in 1 week as needed, return to ER for fevers and shortness of breath or any concerns.  I have encouraged him to continue the prednisone and cefdinir as prescribed by his ENT.  At this time I think it is unlikely that this is  related to his lisinopril though this may need to be further evaluated if cough does not resolve. [KA]   1314 Patient changes mind and would like prescription antitussive, have prescribed Phenergan DM. [KA]      ED Course User Index  [DC] Remberto Frey MD  [KA] Emani Delgado PA-C       Viral swab positive for coronavirus, no overt abnormalities on chest x-ray, overall reassuring exam in no acute distress.  Will trial an albuterol inhaler but ultimately continue outpatient follow-up.  ED return for worsening symptoms as needed.     SHARED VISIT: This visit was performed by BOTH a physician and an APC. The substantive portion of the medical decision making was performed by this attesting physician who made or approved the management plan and takes responsibility for patient management. All studies in the APC note (if performed) were independently interpreted by me.               Remberto Frey MD  01/12/24 [id number]

## (undated) DEVICE — TUBING, SUCTION, 3/16" X 6', STRAIGHT: Brand: MEDLINE

## (undated) DEVICE — ENDOSCOPIC KIT 2 12 FT OP4 DE2 GWN SYR

## (undated) DEVICE — SYRINGE MED 50ML LUERLOCK TIP

## (undated) DEVICE — CHLORAPREP 26ML ORANGE

## (undated) DEVICE — SOLUTION IV IRRIG POUR BRL 0.9% SODIUM CHL 2F7124

## (undated) DEVICE — SUTURE PERMAHAND SZ 3-0 L30IN NONABSORBABLE BLK L26MM SH C017D

## (undated) DEVICE — ELECTRODE LAP L36CM PTFE WIRE J HK CLEANCOAT

## (undated) DEVICE — SUTURE MCRYL + SZ 4-0 L18IN ABSRB UD L19MM PS-2 3/8 CIR MCP496G

## (undated) DEVICE — SUTURE PERMA-HAND SZ 0 L18IN NONABSORBABLE BLK L30MM FSL 678G

## (undated) DEVICE — DRAIN SURG 24FR L5/16IN DIA8MM SIL RND HUBLESS FULL FLUT

## (undated) DEVICE — CONMED SCOPE SAVER BITE BLOCK, 20X27 MM: Brand: SCOPE SAVER

## (undated) DEVICE — ELECTRODE,RADIOTRANSLUCENT,FOAM,3PK: Brand: MEDLINE

## (undated) DEVICE — SUTURE VCRL + SZ 3-0 L27IN ABSRB WHT CT-1 1/2 CIR VCP258H

## (undated) DEVICE — LINER,SOFT,SUCTION CANISTER,1500CC: Brand: MEDLINE

## (undated) DEVICE — Device

## (undated) DEVICE — TROCAR: Brand: KII FIOS FIRST ENTRY

## (undated) DEVICE — RELOAD STPL L45MM VASCULAR/MEDIUM TISS TAN CRV TIP ARTC

## (undated) DEVICE — ANTI-FOG SOLUTION WITH FOAM PAD: Brand: DEVON

## (undated) DEVICE — RELOAD STPL H1.5X3.6XL60MM REG TISS BLU B FRM AUTO RET PIN

## (undated) DEVICE — YANKAUER,BULB TIP,W/O VENT,RIGID,STERILE: Brand: MEDLINE

## (undated) DEVICE — SYRINGE MED 10ML SLIP TIP BLNT FILL AND LUERLOCK DISP

## (undated) DEVICE — CANNULA,OXY,ADULT,SUPERSOFT,W/7'TUB,SC: Brand: MEDLINE INDUSTRIES, INC.

## (undated) DEVICE — BOWL MED M 16OZ PLAS CAP GRAD

## (undated) DEVICE — CONNECTOR PERF W0.25XH3/8IN BASE Y SHP REDUC W/O LUERLOCK

## (undated) DEVICE — PROBE CRYOABLATION L10CM ALUM SMOOTH MAL CRYOICE

## (undated) DEVICE — TRAP,MUCUS SPECIMEN, 80CC: Brand: MEDLINE

## (undated) DEVICE — STAPLER INT L60MM REG TISS BLU B FRM 8 FIRING 2 ROW AUTO

## (undated) DEVICE — TUBING, SUCTION, 3/16" X 12', STRAIGHT: Brand: MEDLINE

## (undated) DEVICE — STAPLER INT L16CM STD UNIV RELD DISP TRI-STAPLE ENDO GIA

## (undated) DEVICE — STERILE LATEX POWDER-FREE SURGICAL GLOVESWITH NITRILE COATING: Brand: PROTEXIS

## (undated) DEVICE — PROBE ABLATN NERVE BLOCK 180 DEG 8 MM BALL TIP CRYOSPHERE

## (undated) DEVICE — [HIGH FLOW INSUFFLATOR,  DO NOT USE IF PACKAGE IS DAMAGED,  KEEP DRY,  KEEP AWAY FROM SUNLIGHT,  PROTECT FROM HEAT AND RADIOACTIVE SOURCES.]: Brand: PNEUMOSURE

## (undated) DEVICE — SINGLE USE BIOPSY VALVE MAJ-210: Brand: SINGLE USE BIOPSY VALVE (STERILE)

## (undated) DEVICE — TROCAR: Brand: KII SLEEVE

## (undated) DEVICE — SUTURE NONABSORBABLE MONOFILAMENT 4-0 RB-1 36 IN BLU PROLENE 8557H

## (undated) DEVICE — RELOAD STPL 3.5MM L60MM 0DEG UNIV TISS PUR TI 6 ROW LIN

## (undated) DEVICE — SUTURE VCRL SZ 0 L36IN ABSRB UD CT-1 L36MM 1/2 CIR TAPR PNT VCP946H

## (undated) DEVICE — SYRINGE MED 50ML LUERSLIP TIP

## (undated) DEVICE — SINGLE USE SUCTION VALVE MAJ-209: Brand: SINGLE USE SUCTION VALVE (STERILE)